# Patient Record
Sex: FEMALE | NOT HISPANIC OR LATINO | Employment: FULL TIME | URBAN - METROPOLITAN AREA
[De-identification: names, ages, dates, MRNs, and addresses within clinical notes are randomized per-mention and may not be internally consistent; named-entity substitution may affect disease eponyms.]

---

## 2016-11-04 LAB — HIV: NON REACTIVE

## 2018-05-09 ENCOUNTER — OFFICE VISIT (OUTPATIENT)
Dept: FAMILY MEDICINE | Facility: CLINIC | Age: 43
End: 2018-05-09
Payer: COMMERCIAL

## 2018-05-09 VITALS
DIASTOLIC BLOOD PRESSURE: 55 MMHG | TEMPERATURE: 98 F | SYSTOLIC BLOOD PRESSURE: 97 MMHG | WEIGHT: 204 LBS | BODY MASS INDEX: 30.21 KG/M2 | HEIGHT: 69 IN | RESPIRATION RATE: 18 BRPM | HEART RATE: 69 BPM | OXYGEN SATURATION: 98 %

## 2018-05-09 DIAGNOSIS — R01.1 HEART MURMUR PREVIOUSLY UNDIAGNOSED: ICD-10-CM

## 2018-05-09 DIAGNOSIS — Z12.39 SCREENING FOR MALIGNANT NEOPLASM OF BREAST: ICD-10-CM

## 2018-05-09 DIAGNOSIS — E07.9 THYROID DISEASE: Primary | ICD-10-CM

## 2018-05-09 DIAGNOSIS — N35.9 URETHRAL STRICTURE, UNSPECIFIED STRICTURE TYPE: ICD-10-CM

## 2018-05-09 LAB
BILIRUB SERPL-MCNC: ABNORMAL MG/DL
BLOOD URINE, POC: ABNORMAL
COLOR, POC UA: YELLOW
GLUCOSE UR QL STRIP: NEGATIVE
KETONES UR QL STRIP: NEGATIVE
LEUKOCYTE ESTERASE URINE, POC: ABNORMAL
NITRITE, POC UA: NEGATIVE
PH, POC UA: 5
PROTEIN, POC: ABNORMAL
SPECIFIC GRAVITY, POC UA: 1.02
UROBILINOGEN, POC UA: 0.2

## 2018-05-09 PROCEDURE — 81002 URINALYSIS NONAUTO W/O SCOPE: CPT | Mod: S$GLB,,, | Performed by: FAMILY MEDICINE

## 2018-05-09 PROCEDURE — 99213 OFFICE O/P EST LOW 20 MIN: CPT | Mod: 25,S$GLB,, | Performed by: FAMILY MEDICINE

## 2018-05-09 PROCEDURE — 3008F BODY MASS INDEX DOCD: CPT | Mod: CPTII,S$GLB,, | Performed by: FAMILY MEDICINE

## 2018-05-09 RX ORDER — IBUPROFEN 800 MG/1
800 TABLET ORAL 3 TIMES DAILY
COMMUNITY
End: 2020-02-17

## 2018-05-09 RX ORDER — CIPROFLOXACIN 500 MG/1
500 TABLET ORAL EVERY 12 HOURS
Qty: 14 TABLET | Refills: 0 | Status: SHIPPED | OUTPATIENT
Start: 2018-05-09 | End: 2018-05-16

## 2018-05-09 NOTE — PROGRESS NOTES
Subjective:       Patient ID: Tania Smith is a 43 y.o. female.    Chief Complaint: Establish Care and Thyroid Problem    HPI   Ms. Smith presents to [facility]. Has not seen a doctor in 6-12 months. Complains of fatigue and hair loss for several months. Reports a history of abnormal thyroid labs. Also has a history of ureteral stricture - dilation. Complains of dysuria and increased frequency of urination for several days.     Has never had a mammogram; no family history of abnormal mammograms.    Review of Systems   Constitutional: Positive for fatigue. Negative for appetite change, fever and unexpected weight change.   Cardiovascular: Negative for chest pain, palpitations and leg swelling.   Endocrine: Positive for polyuria. Negative for cold intolerance and heat intolerance.        HAIR LOSS   Genitourinary: Positive for dysuria. Negative for flank pain, frequency and hematuria.   Skin: Negative for color change, pallor, rash and wound.       Past Medical History:   Diagnosis Date    Neuromuscular disorder     Thyroid disease      Past Surgical History:   Procedure Laterality Date    CHOLECYSTECTOMY      HYSTERECTOMY  2010    partial - still has ovaries    URETHRA SURGERY  2017     Social History     Social History    Marital status: Single     Spouse name: N/A    Number of children: N/A    Years of education: N/A     Occupational History    Not on file.     Social History Main Topics    Smoking status: Current Every Day Smoker     Packs/day: 1.00    Smokeless tobacco: Never Used    Alcohol use Yes      Comment: socially     Drug use: No    Sexual activity: Yes     Partners: Male     Birth control/ protection: See Surgical Hx     Other Topics Concern    Not on file     Social History Narrative    No narrative on file     Family History   Problem Relation Age of Onset    Hypertension Mother     Diabetes Mother     Hepatitis Mother     Parkinsonism Father     Throat cancer Father      "Parkinsonism Sister     Crohn's disease Sister        Objective:      BP (!) 97/55 (BP Location: Left arm, Patient Position: Sitting)   Pulse 69   Temp 98.3 °F (36.8 °C) (Tympanic)   Resp 18   Ht 5' 9" (1.753 m)   Wt 92.5 kg (204 lb)   LMP  (LMP Unknown)   SpO2 98%   BMI 30.13 kg/m²   Physical Exam   Constitutional: She is oriented to person, place, and time. She appears well-developed and well-nourished. No distress.   Eyes: Conjunctivae and EOM are normal. Pupils are equal, round, and reactive to light. No scleral icterus.   Cardiovascular: Normal rate and regular rhythm.  Exam reveals no friction rub.    Murmur heard.  Pulmonary/Chest: Effort normal and breath sounds normal. No respiratory distress. She has no wheezes.   Neurological: She is alert and oriented to person, place, and time. No cranial nerve deficit.   Skin: Skin is warm and dry. No rash noted. She is not diaphoretic.   Vitals reviewed.      Assessment:       1. Thyroid disease    2. Urethral stricture, unspecified stricture type    3. Screening for malignant neoplasm of breast    4. Heart murmur previously undiagnosed        Plan:       Thyroid disease  -     TSH previously abnormal; due for bloodwork  -     TSH; Future; Expected date: 05/09/2018  -     T4, free; Future; Expected date: 05/09/2018  -     CBC auto differential; Future; Expected date: 05/09/2018  -     Comprehensive metabolic panel; Future; Expected date: 05/09/2018    Urethral stricture, unspecified stricture type  -    +UTI; sending in antibiotic  -     POCT urine dipstick without microscope    Screening for malignant neoplasm of breast  - patient has never had a mammogram  -     Mammo Digital Screening Bilateral With CAD; Future; Expected date: 05/09/2018    Heart murmur previously undiagnosed  -     With associated fatigue; start work-up with echo; see for follow-up/result review in 2-3 weeks  -     Transthoracic echo (TTE) complete (CUPID ONLY-[facility], [facility] [place]); Future            Risks, benefits, and side effects were discussed with the patient. All questions were answered to the fullest satisfaction of the patient, and pt verbalized understanding and agreement to treatment plan. Pt was to call with any new or worsening symptoms, or present to the ER.

## 2018-05-18 ENCOUNTER — HOSPITAL ENCOUNTER (OUTPATIENT)
Dept: RADIOLOGY | Facility: HOSPITAL | Age: 43
Discharge: HOME | End: 2018-05-18
Attending: FAMILY MEDICINE
Payer: COMMERCIAL

## 2018-05-18 ENCOUNTER — HOSPITAL ENCOUNTER (OUTPATIENT)
Dept: CARDIOLOGY | Facility: HOSPITAL | Age: 43
Discharge: HOME | End: 2018-05-18
Attending: FAMILY MEDICINE
Payer: COMMERCIAL

## 2018-05-18 DIAGNOSIS — Z12.39 SCREENING FOR MALIGNANT NEOPLASM OF BREAST: ICD-10-CM

## 2018-05-18 DIAGNOSIS — R01.1 HEART MURMUR PREVIOUSLY UNDIAGNOSED: ICD-10-CM

## 2018-05-18 PROCEDURE — 77067 SCR MAMMO BI INCL CAD: CPT | Mod: 26,,, | Performed by: RADIOLOGY

## 2018-05-18 PROCEDURE — 93306 TTE W/DOPPLER COMPLETE: CPT | Mod: 26,,, | Performed by: FAMILY MEDICINE

## 2018-05-18 PROCEDURE — C8929 TTE W OR WO FOL WCON,DOPPLER: HCPCS

## 2018-05-18 PROCEDURE — 77067 SCR MAMMO BI INCL CAD: CPT | Mod: TC

## 2018-05-22 LAB
AORTIC VALVE CUSP SEPERATION: 2 CM
AV PEAK GRADIENT: 9 MMHG
AV VELOCITY RATIO: 1.07
CV ECHO LV RWT: 0.43 CM
DOP CALC AO PEAK VEL: 1.5 M/S
DOP CALC LVOT PEAK VEL: 1.6 M/S
ECHO EF ESTIMATED: 55 %
ECHO LV POSTERIOR WALL: 0.9 CM (ref 0.6–1.1)
FRACTIONAL SHORTENING: 41 % (ref 28–44)
INTERVENTRICULAR SEPTUM: 1.3 CM (ref 0.6–1.1)
LEFT ATRIUM SIZE: 4.2 CM
LEFT INTERNAL DIMENSION IN SYSTOLE: 3 CM (ref 2.1–4)
LEFT VENTRICULAR INTERNAL DIMENSION IN DIASTOLE: 5.1 CM (ref 3.5–6)
MV STENOSIS PRESSURE HALF TIME: 72 MS
MV VALVE AREA P 1/2 METHOD: 3.06 CM2
TR MAX PG: 30 MMHG

## 2018-06-06 ENCOUNTER — OFFICE VISIT (OUTPATIENT)
Dept: FAMILY MEDICINE | Facility: CLINIC | Age: 43
End: 2018-06-06
Payer: COMMERCIAL

## 2018-06-06 VITALS
HEIGHT: 68 IN | DIASTOLIC BLOOD PRESSURE: 68 MMHG | OXYGEN SATURATION: 99 % | WEIGHT: 200 LBS | HEART RATE: 57 BPM | BODY MASS INDEX: 30.31 KG/M2 | TEMPERATURE: 97 F | SYSTOLIC BLOOD PRESSURE: 134 MMHG | RESPIRATION RATE: 18 BRPM

## 2018-06-06 DIAGNOSIS — E07.9 THYROID DISEASE: Primary | Chronic | ICD-10-CM

## 2018-06-06 DIAGNOSIS — F17.200 TOBACCO DEPENDENCE: ICD-10-CM

## 2018-06-06 DIAGNOSIS — L65.9 HAIR LOSS: ICD-10-CM

## 2018-06-06 PROCEDURE — 3008F BODY MASS INDEX DOCD: CPT | Mod: CPTII,S$GLB,, | Performed by: FAMILY MEDICINE

## 2018-06-06 PROCEDURE — 99213 OFFICE O/P EST LOW 20 MIN: CPT | Mod: S$GLB,,, | Performed by: FAMILY MEDICINE

## 2018-06-06 RX ORDER — BUPROPION HYDROCHLORIDE 150 MG/1
150 TABLET ORAL DAILY
Qty: 90 TABLET | Refills: 3 | Status: SHIPPED | OUTPATIENT
Start: 2018-06-06 | End: 2020-02-03 | Stop reason: SDUPTHER

## 2018-06-06 NOTE — LETTER
June 6, 2018      [facility] - [facility] Medicine  [address] [place] [address] [place] [address]  Phone: [phone]  Fax: [phone]       Patient: Tania Smith   YOB: 1975  Date of Visit: 06/06/2018    To Whom It May Concern:    Norberto Smith  was at [facility] on 06/06/2018. She may return to work on 6/6/2018 with no restrictions. If you have any questions or concerns, or if I can be of further assistance, please do not hesitate to contact me.    Sincerely,    Tiffany Atwood MA

## 2018-06-06 NOTE — PROGRESS NOTES
Subjective:       Patient ID: Tania Smith is a 43 y.o. female.    Chief Complaint: Follow-up (lab results)    HPI   Ms. Smith presents to f/u with labs.  Complains of fatigue and hair loss for several months. Reports a history of abnormal thyroid labs.  Has a history of eczema of the scalp.  Has tried several shampoos with no improvement.  Has seen dermatology in the past and was given a steroid liquid to put on her scalp.  She says it didn't help.      Hospital Outpatient Visit on 05/18/2018   Component Date Value Ref Range Status    AORTIC VALVE CUSP SEPERATION 05/18/2018 2  cm Final    LVIDD 05/18/2018 5.1  3.5 - 6.0 cm Final    IVS 05/18/2018 1.3  0.6 - 1.1 cm Final    PW 05/18/2018 0.9  0.6 - 1.1 cm Final    LVIDS 05/18/2018 3.0  2.1 - 4.0 cm Final    FS 05/18/2018 41  28 - 44 % Final    LA size 05/18/2018 4.2  cm Final    Left Ventricle Relative Wall Thick* 05/18/2018 0.43  cm Final    AV Velocity Ratio 05/18/2018 1.07   Final    MV valve area p 1/2 method 05/18/2018 3.06  cm2 Final    LVOT peak stacie 05/18/2018 1.6  m/s Final    Ao peak stacie 05/18/2018 1.5  m/s Final    AV peak gradient 05/18/2018 9  mmHg Final    MV stenosis pressure 1/2 time 05/18/2018 72  ms Final    Triscuspid Valve Regurgitation Pea* 05/18/2018 30.0  mmHg Final    Echo EF Estimated 05/18/2018 55  % Final   Lab Visit on 05/18/2018   Component Date Value Ref Range Status    TSH 05/18/2018 3.303  0.340 - 5.600 uIU/mL Final    Free T4 05/18/2018 0.74  0.71 - 1.51 ng/dL Final    WBC 05/18/2018 6.93  3.90 - 12.70 K/uL Final    RBC 05/18/2018 4.17  4.00 - 5.40 M/uL Final    Hemoglobin 05/18/2018 13.0  12.0 - 16.0 g/dL Final    Hematocrit 05/18/2018 38.3  37.0 - 48.5 % Final    MCV 05/18/2018 92  82 - 98 fL Final    MCH 05/18/2018 31.2* 27.0 - 31.0 pg Final    MCHC 05/18/2018 33.9  32.0 - 36.0 g/dL Final    RDW 05/18/2018 12.3  11.5 - 14.5 % Final    Platelets 05/18/2018 247  150 - 350 K/uL Final    MPV 05/18/2018 10.0   9.2 - 12.9 fL Final    Immature Granulocytes 05/18/2018 0.3  0.0 - 0.5 % Final    Gran # (ANC) 05/18/2018 3.8  1.8 - 7.7 K/uL Final    Immature Grans (Abs) 05/18/2018 0.02  0.00 - 0.04 K/uL Final    Comment: Mild elevation in immature granulocytes is non specific and   can be seen in a variety of conditions including stress response,   acute inflammation, trauma and pregnancy. Correlation with other   laboratory and clinical findings is essential.      Lymph # 05/18/2018 2.3  1.0 - 4.8 K/uL Final    Mono # 05/18/2018 0.5  0.3 - 1.0 K/uL Final    Eos # 05/18/2018 0.2  0.0 - 0.5 K/uL Final    Baso # 05/18/2018 0.04  0.00 - 0.20 K/uL Final    nRBC 05/18/2018 0  0 /100 WBC Final    Gran% 05/18/2018 54.6  38.0 - 73.0 % Final    Lymph% 05/18/2018 33.8  18.0 - 48.0 % Final    Mono% 05/18/2018 7.8  4.0 - 15.0 % Final    Eosinophil% 05/18/2018 2.9  0.0 - 8.0 % Final    Basophil% 05/18/2018 0.6  0.0 - 1.9 % Final    Differential Method 05/18/2018 Automated   Final    Sodium 05/18/2018 138  136 - 145 mmol/L Final    Potassium 05/18/2018 4.0  3.5 - 5.1 mmol/L Final    Chloride 05/18/2018 107  95 - 110 mmol/L Final    CO2 05/18/2018 26  23 - 29 mmol/L Final    Glucose 05/18/2018 104  70 - 110 mg/dL Final    BUN, Bld 05/18/2018 16  6 - 20 mg/dL Final    Creatinine 05/18/2018 0.8  0.5 - 1.4 mg/dL Final    Calcium 05/18/2018 8.6* 8.7 - 10.5 mg/dL Final    Total Protein 05/18/2018 6.7  6.0 - 8.4 g/dL Final    Albumin 05/18/2018 4.2  3.5 - 5.2 g/dL Final    Total Bilirubin 05/18/2018 0.3  0.1 - 1.0 mg/dL Final    Comment: For infants and newborns, interpretation of results should be based  on gestational age, weight and in agreement with clinical  observations.  Premature Infant recommended reference ranges:  Up to 24 hours.............<8.0 mg/dL  Up to 48 hours............<12.0 mg/dL  3-5 days..................<15.0 mg/dL  6-29 days.................<15.0 mg/dL      Alkaline Phosphatase 05/18/2018 38* 55 -  135 U/L Final    AST 05/18/2018 17  10 - 40 U/L Final    ALT 05/18/2018 13  10 - 44 U/L Final    Anion Gap 05/18/2018 5* 8 - 16 mmol/L Final    eGFR if African American 05/18/2018 >60.0  >60 mL/min/1.73 m^2 Final    eGFR if non African American 05/18/2018 >60.0  >60 mL/min/1.73 m^2 Final    Comment: Calculation used to obtain the estimated glomerular filtration  rate (eGFR) is the CKD-EPI equation.      Office Visit on 05/09/2018   Component Date Value Ref Range Status    Color, UA 05/09/2018 yellow   Final    Spec Grav UA 05/09/2018 1.025   Final    pH, UA 05/09/2018 5.0   Final    WBC, UA 05/09/2018 small   Final    Nitrite, UA 05/09/2018 negative   Final    Protein 05/09/2018 trace   Final    Glucose, UA 05/09/2018 negative   Final    Ketones, UA 05/09/2018 negative   Final    Urobilinogen, UA 05/09/2018 0.2   Final    Bilirubin 05/09/2018 small   Final    Blood, UA 05/09/2018 large   Final             Review of Systems   Constitutional: Positive for fatigue. Negative for appetite change, fever and unexpected weight change.   Cardiovascular: Negative for chest pain, palpitations and leg swelling.   Endocrine: Negative for cold intolerance, heat intolerance and polyuria.        HAIR LOSS   Genitourinary: Negative for dysuria, flank pain, frequency and hematuria.   Skin: Negative for color change, pallor, rash and wound.       Past Medical History:   Diagnosis Date    Neuromuscular disorder     Thyroid disease      Past Surgical History:   Procedure Laterality Date    CHOLECYSTECTOMY      HYSTERECTOMY  2010    partial - still has ovaries    URETHRA SURGERY  2017     Social History     Social History    Marital status: Single     Spouse name: N/A    Number of children: N/A    Years of education: N/A     Occupational History    Not on file.     Social History Main Topics    Smoking status: Current Every Day Smoker     Packs/day: 1.00    Smokeless tobacco: Never Used    Alcohol use Yes       "Comment: socially     Drug use: No    Sexual activity: Yes     Partners: Male     Birth control/ protection: See Surgical Hx     Other Topics Concern    Not on file     Social History Narrative    No narrative on file     Family History   Problem Relation Age of Onset    Hypertension Mother     Diabetes Mother     Hepatitis Mother     Parkinsonism Father     Throat cancer Father     Parkinsonism Sister     Crohn's disease Sister     Breast cancer Neg Hx        Objective:      /68   Pulse (!) 57   Temp 97.3 °F (36.3 °C) (Tympanic)   Resp 18   Ht 5' 8" (1.727 m)   Wt 90.7 kg (200 lb)   LMP  (LMP Unknown)   SpO2 99%   BMI 30.41 kg/m²   Physical Exam   Constitutional: She is oriented to person, place, and time. She appears well-developed and well-nourished. No distress.   Eyes: Conjunctivae and EOM are normal. Pupils are equal, round, and reactive to light. No scleral icterus.   Cardiovascular: Normal rate and regular rhythm.  Exam reveals no friction rub.    Murmur heard.  Pulmonary/Chest: Effort normal and breath sounds normal. No respiratory distress. She has no wheezes.   Neurological: She is alert and oriented to person, place, and time. No cranial nerve deficit.   Skin: Skin is warm and dry. Rash noted. She is not diaphoretic.   Multiple eczematous areas on hands, arms and scalp.    Vitals reviewed.      Assessment:       1. Thyroid disease    2. Tobacco dependence    3. Hair loss        Plan:       Thyroid disease   -  stable.  Continue current medication  Tobacco dependence  -     buPROPion (WELLBUTRIN XL) 150 MG TB24 tablet; Take 1 tablet (150 mg total) by mouth once daily.  Dispense: 90 tablet; Refill: 3    Hair loss   - Scalp eczema is most likely cause.  She can try sedrick tree oil on her scalp.  If this doesn't help she can go back to dermatology.                 Risks, benefits, and side effects were discussed with the patient. All questions were answered to the fullest satisfaction " of the patient, and pt verbalized understanding and agreement to treatment plan. Pt was to call with any new or worsening symptoms, or present to the ER.

## 2020-01-20 ENCOUNTER — OFFICE VISIT (OUTPATIENT)
Dept: FAMILY MEDICINE | Facility: CLINIC | Age: 45
End: 2020-01-20
Payer: COMMERCIAL

## 2020-01-20 VITALS
RESPIRATION RATE: 16 BRPM | TEMPERATURE: 98 F | SYSTOLIC BLOOD PRESSURE: 118 MMHG | OXYGEN SATURATION: 98 % | WEIGHT: 221.81 LBS | HEIGHT: 68 IN | DIASTOLIC BLOOD PRESSURE: 78 MMHG | HEART RATE: 75 BPM | BODY MASS INDEX: 33.62 KG/M2

## 2020-01-20 DIAGNOSIS — R39.9 LOWER URINARY TRACT SYMPTOMS: ICD-10-CM

## 2020-01-20 DIAGNOSIS — R09.81 NASAL CONGESTION: ICD-10-CM

## 2020-01-20 DIAGNOSIS — L40.9 PSORIASIS: Primary | ICD-10-CM

## 2020-01-20 DIAGNOSIS — N32.0: ICD-10-CM

## 2020-01-20 PROCEDURE — 99214 PR OFFICE/OUTPT VISIT, EST, LEVL IV, 30-39 MIN: ICD-10-PCS | Mod: S$GLB,,, | Performed by: FAMILY MEDICINE

## 2020-01-20 PROCEDURE — 99214 OFFICE O/P EST MOD 30 MIN: CPT | Mod: S$GLB,,, | Performed by: FAMILY MEDICINE

## 2020-01-20 RX ORDER — CLOBETASOL PROPIONATE 0.46 MG/ML
SOLUTION TOPICAL 2 TIMES DAILY
Qty: 50 ML | Refills: 1 | Status: SHIPPED | OUTPATIENT
Start: 2020-01-20

## 2020-01-20 RX ORDER — NITROFURANTOIN 25; 75 MG/1; MG/1
100 CAPSULE ORAL 2 TIMES DAILY
Qty: 14 CAPSULE | Refills: 0 | Status: SHIPPED | OUTPATIENT
Start: 2020-01-20 | End: 2020-02-03 | Stop reason: ALTCHOICE

## 2020-01-20 RX ORDER — CLOBETASOL PROPIONATE 0.5 MG/G
CREAM TOPICAL 2 TIMES DAILY
Qty: 60 G | Refills: 11 | Status: SHIPPED | OUTPATIENT
Start: 2020-01-20

## 2020-01-20 RX ORDER — GUAIFENESIN, PSEUDOEPHEDRINE HYDROCHLORIDE 600; 60 MG/1; MG/1
2 TABLET, EXTENDED RELEASE ORAL 2 TIMES DAILY
Qty: 60 TABLET | Refills: 0 | Status: SHIPPED | OUTPATIENT
Start: 2020-01-20 | End: 2020-01-30

## 2020-01-20 NOTE — PROGRESS NOTES
"  [facility] - Clinic Note    Subjective      Ms. Smith is a 44 y.o. female who presents to clinic for follow up.     Psoriasis  Has seen a Dermatologist. Has used creams before that work but this is worse than normal.    UTI symptoms. Slow urination. Has a history of a stricture. Now just with slow flow.   Has a bladder stricture. Has had a dilator.     Viral URI   Sinus congestion, rhinorrhea, coughing. No fevers. Chest congestion.         PMH Tania has a past medical history of Neuromuscular disorder and Thyroid disease.   PSXH Tania has a past surgical history that includes Cholecystectomy; Urethra surgery (2017); and Hysterectomy (2010).   FH Tania's family history includes Crohn's disease in her sister; Diabetes in her mother; Hepatitis in her mother; Hypertension in her mother; Parkinsonism in her father and sister; Throat cancer in her father.   YAZMIN Marin reports that she has been smoking. She has been smoking about 1.00 pack per day. She has never used smokeless tobacco. She reports that she drinks alcohol. She reports that she does not use drugs.   MIKE Marin is allergic to codeine.   RAJANI Marin has a current medication list which includes the following prescription(s): bupropion, clobetasol, clobetasol, ibuprofen, nitrofurantoin (macrocrystal-monohydrate), and pseudoephedrine-guaifenesin  mg.     Review of Systems   Constitutional: Negative for fever.   HENT: Positive for congestion and rhinorrhea. Negative for sinus pressure.    Respiratory: Positive for cough. Negative for shortness of breath.    Skin: Positive for rash.     ROS otherwise negative  Objective     /78 (BP Location: Right arm, Patient Position: Sitting, BP Method: Medium (Automatic))   Pulse 75   Temp 98.2 °F (36.8 °C) (Oral)   Resp 16   Ht 5' 8" (1.727 m)   Wt 100.6 kg (221 lb 12.8 oz)   LMP  (LMP Unknown)   SpO2 98%   BMI 33.72 kg/m²     Physical Exam   Constitutional: She appears well-developed and " well-nourished. No distress.   HENT:   Head: Normocephalic and atraumatic.   Right Ear: External ear normal.   Left Ear: External ear normal.   Mouth/Throat: Mucous membranes are normal. Posterior oropharyngeal erythema present. No oropharyngeal exudate or tonsillar abscesses.   Eyes: Conjunctivae are normal.   Neck: No thyromegaly present.   Cardiovascular: Normal rate, regular rhythm, normal heart sounds and intact distal pulses.   No murmur heard.  Pulmonary/Chest: Effort normal and breath sounds normal. She has no wheezes. She has no rales.   Abdominal: She exhibits no distension.   Musculoskeletal: She exhibits no deformity.   Neurological: She is alert.   Skin: Skin is warm.   Psychiatric: She has a normal mood and affect.   Vitals reviewed.             Assessment/Plan     1. Psoriasis  clobetasol (TEMOVATE) 0.05 % external solution    clobetasol (TEMOVATE) 0.05 % cream   2. Nasal congestion  pseudoephedrine-guaifenesin  mg (MUCINEX D)  mg per tablet   3. Stricture of bladder neck  Ambulatory referral to Urology   4. Lower urinary tract symptoms  Urine culture    nitrofurantoin, macrocrystal-monohydrate, (MACROBID) 100 MG capsule       Advised that mucinex d could worsen urinary symptoms. Strict precautions given.   Recommend moisturizers including Vaseline on areas of dry, reddened skin.   Clobetasol for scalp and legs.    Follow up in about 1 month (around 2/20/2020).        Nica Ortega MD  Family Medicine  [facility]  [phone]

## 2020-01-21 ENCOUNTER — TELEPHONE (OUTPATIENT)
Dept: FAMILY MEDICINE | Facility: CLINIC | Age: 45
End: 2020-01-21

## 2020-01-21 NOTE — TELEPHONE ENCOUNTER
All script call in.        Attempt to call pt, no answer, left message to return the call.          ----- Message from Candice Mills sent at 1/21/2020 12:17 PM CST -----  Contact: patient  Type: Needs Medical Advice    Who Called:  Patient  Symptoms (please be specific):    How long has patient had these symptoms:    Pharmacy name and phone #:    Best Call Back Number: [phone]  Additional Information: requesting a call back regarding medications,didn't know the name of them

## 2020-01-24 DIAGNOSIS — L40.9 PSORIASIS: ICD-10-CM

## 2020-01-24 RX ORDER — CLOBETASOL PROPIONATE 0.5 MG/G
CREAM TOPICAL 2 TIMES DAILY
Qty: 60 G | Refills: 11 | Status: CANCELLED | OUTPATIENT
Start: 2020-01-24

## 2020-01-24 RX ORDER — CLOBETASOL PROPIONATE 0.46 MG/ML
SOLUTION TOPICAL 2 TIMES DAILY
Qty: 50 ML | Refills: 1 | Status: CANCELLED | OUTPATIENT
Start: 2020-01-24

## 2020-02-03 ENCOUNTER — PATIENT OUTREACH (OUTPATIENT)
Dept: ADMINISTRATIVE | Facility: OTHER | Age: 45
End: 2020-02-03

## 2020-02-03 ENCOUNTER — OFFICE VISIT (OUTPATIENT)
Dept: UROLOGY | Facility: CLINIC | Age: 45
End: 2020-02-03
Payer: COMMERCIAL

## 2020-02-03 ENCOUNTER — LAB VISIT (OUTPATIENT)
Dept: LAB | Facility: HOSPITAL | Age: 45
End: 2020-02-03
Attending: FAMILY MEDICINE
Payer: COMMERCIAL

## 2020-02-03 ENCOUNTER — OFFICE VISIT (OUTPATIENT)
Dept: FAMILY MEDICINE | Facility: CLINIC | Age: 45
End: 2020-02-03
Payer: COMMERCIAL

## 2020-02-03 VITALS
HEART RATE: 67 BPM | HEIGHT: 69 IN | OXYGEN SATURATION: 97 % | RESPIRATION RATE: 15 BRPM | SYSTOLIC BLOOD PRESSURE: 112 MMHG | BODY MASS INDEX: 32.83 KG/M2 | DIASTOLIC BLOOD PRESSURE: 77 MMHG | WEIGHT: 221.63 LBS | TEMPERATURE: 98 F

## 2020-02-03 VITALS
HEIGHT: 69 IN | TEMPERATURE: 98 F | DIASTOLIC BLOOD PRESSURE: 64 MMHG | HEART RATE: 69 BPM | SYSTOLIC BLOOD PRESSURE: 110 MMHG | BODY MASS INDEX: 33.03 KG/M2 | WEIGHT: 223 LBS

## 2020-02-03 DIAGNOSIS — Z01.818 PREOPERATIVE CLEARANCE: ICD-10-CM

## 2020-02-03 DIAGNOSIS — N35.92 STRICTURE OF FEMALE URETHRA, UNSPECIFIED STRICTURE TYPE: Primary | ICD-10-CM

## 2020-02-03 DIAGNOSIS — E07.9 THYROID DISORDER: ICD-10-CM

## 2020-02-03 DIAGNOSIS — Z13.220 SCREENING FOR HYPERLIPIDEMIA: ICD-10-CM

## 2020-02-03 DIAGNOSIS — Z12.31 ENCOUNTER FOR SCREENING MAMMOGRAM FOR BREAST CANCER: Primary | ICD-10-CM

## 2020-02-03 DIAGNOSIS — F17.200 TOBACCO DEPENDENCE: ICD-10-CM

## 2020-02-03 LAB
ANION GAP SERPL CALC-SCNC: 10 MMOL/L (ref 8–16)
BASOPHILS # BLD AUTO: 0.06 K/UL (ref 0–0.2)
BASOPHILS NFR BLD: 0.8 % (ref 0–1.9)
BILIRUB SERPL-MCNC: NEGATIVE MG/DL
BLOOD URINE, POC: NEGATIVE
BUN SERPL-MCNC: 13 MG/DL (ref 6–20)
CALCIUM SERPL-MCNC: 9.1 MG/DL (ref 8.7–10.5)
CHLORIDE SERPL-SCNC: 107 MMOL/L (ref 95–110)
CHOLEST SERPL-MCNC: 210 MG/DL (ref 120–199)
CHOLEST/HDLC SERPL: 6.2 {RATIO} (ref 2–5)
CO2 SERPL-SCNC: 23 MMOL/L (ref 23–29)
COLOR, POC UA: NORMAL
CREAT SERPL-MCNC: 0.8 MG/DL (ref 0.5–1.4)
DIFFERENTIAL METHOD: NORMAL
EOSINOPHIL # BLD AUTO: 0.2 K/UL (ref 0–0.5)
EOSINOPHIL NFR BLD: 2.8 % (ref 0–8)
ERYTHROCYTE [DISTWIDTH] IN BLOOD BY AUTOMATED COUNT: 12.9 % (ref 11.5–14.5)
EST. GFR  (AFRICAN AMERICAN): >60 ML/MIN/1.73 M^2
EST. GFR  (NON AFRICAN AMERICAN): >60 ML/MIN/1.73 M^2
GLUCOSE SERPL-MCNC: 99 MG/DL (ref 70–110)
GLUCOSE UR QL STRIP: NEGATIVE
HCT VFR BLD AUTO: 41.9 % (ref 37–48.5)
HDLC SERPL-MCNC: 34 MG/DL (ref 40–75)
HDLC SERPL: 16.2 % (ref 20–50)
HGB BLD-MCNC: 13.8 G/DL (ref 12–16)
IMM GRANULOCYTES # BLD AUTO: 0.02 K/UL (ref 0–0.04)
IMM GRANULOCYTES NFR BLD AUTO: 0.3 % (ref 0–0.5)
KETONES UR QL STRIP: NEGATIVE
LDLC SERPL CALC-MCNC: 145 MG/DL (ref 63–159)
LEUKOCYTE ESTERASE URINE, POC: NEGATIVE
LYMPHOCYTES # BLD AUTO: 2.8 K/UL (ref 1–4.8)
LYMPHOCYTES NFR BLD: 35.4 % (ref 18–48)
MCH RBC QN AUTO: 30.7 PG (ref 27–31)
MCHC RBC AUTO-ENTMCNC: 32.9 G/DL (ref 32–36)
MCV RBC AUTO: 93 FL (ref 82–98)
MONOCYTES # BLD AUTO: 0.6 K/UL (ref 0.3–1)
MONOCYTES NFR BLD: 7.3 % (ref 4–15)
NEUTROPHILS # BLD AUTO: 4.2 K/UL (ref 1.8–7.7)
NEUTROPHILS NFR BLD: 53.4 % (ref 38–73)
NITRITE, POC UA: NEGATIVE
NONHDLC SERPL-MCNC: 176 MG/DL
NRBC BLD-RTO: 0 /100 WBC
PH, POC UA: 7
PLATELET # BLD AUTO: 314 K/UL (ref 150–350)
PMV BLD AUTO: 10.1 FL (ref 9.2–12.9)
POTASSIUM SERPL-SCNC: 4.3 MMOL/L (ref 3.5–5.1)
PROTEIN, POC: NEGATIVE
RBC # BLD AUTO: 4.49 M/UL (ref 4–5.4)
SODIUM SERPL-SCNC: 140 MMOL/L (ref 136–145)
SPECIFIC GRAVITY, POC UA: 1.02
T4 FREE SERPL-MCNC: 0.83 NG/DL (ref 0.71–1.51)
TRIGL SERPL-MCNC: 155 MG/DL (ref 30–150)
TSH SERPL DL<=0.005 MIU/L-ACNC: 2.88 UIU/ML (ref 0.34–5.6)
UROBILINOGEN, POC UA: NEGATIVE
WBC # BLD AUTO: 7.91 K/UL (ref 3.9–12.7)

## 2020-02-03 PROCEDURE — 80061 LIPID PANEL: CPT

## 2020-02-03 PROCEDURE — 99214 OFFICE O/P EST MOD 30 MIN: CPT | Mod: S$GLB,,, | Performed by: FAMILY MEDICINE

## 2020-02-03 PROCEDURE — 99999 PR PBB SHADOW E&M-EST. PATIENT-LVL III: CPT | Mod: PBBFAC,,, | Performed by: UROLOGY

## 2020-02-03 PROCEDURE — 99204 PR OFFICE/OUTPT VISIT, NEW, LEVL IV, 45-59 MIN: ICD-10-PCS | Mod: 25,S$GLB,, | Performed by: UROLOGY

## 2020-02-03 PROCEDURE — 99204 OFFICE O/P NEW MOD 45 MIN: CPT | Mod: 25,S$GLB,, | Performed by: UROLOGY

## 2020-02-03 PROCEDURE — 84439 ASSAY OF FREE THYROXINE: CPT

## 2020-02-03 PROCEDURE — 80048 BASIC METABOLIC PNL TOTAL CA: CPT

## 2020-02-03 PROCEDURE — 87086 URINE CULTURE/COLONY COUNT: CPT

## 2020-02-03 PROCEDURE — 99214 PR OFFICE/OUTPT VISIT, EST, LEVL IV, 30-39 MIN: ICD-10-PCS | Mod: S$GLB,,, | Performed by: FAMILY MEDICINE

## 2020-02-03 PROCEDURE — 84443 ASSAY THYROID STIM HORMONE: CPT

## 2020-02-03 PROCEDURE — 85025 COMPLETE CBC W/AUTO DIFF WBC: CPT

## 2020-02-03 PROCEDURE — 99999 PR PBB SHADOW E&M-EST. PATIENT-LVL III: ICD-10-PCS | Mod: PBBFAC,,, | Performed by: UROLOGY

## 2020-02-03 PROCEDURE — 81002 POCT URINE DIPSTICK WITHOUT MICROSCOPE: ICD-10-PCS | Mod: S$GLB,,, | Performed by: UROLOGY

## 2020-02-03 PROCEDURE — 81002 URINALYSIS NONAUTO W/O SCOPE: CPT | Mod: S$GLB,,, | Performed by: UROLOGY

## 2020-02-03 PROCEDURE — 36415 COLL VENOUS BLD VENIPUNCTURE: CPT

## 2020-02-03 RX ORDER — BUPROPION HYDROCHLORIDE 150 MG/1
150 TABLET ORAL DAILY
Qty: 90 TABLET | Refills: 3 | Status: SHIPPED | OUTPATIENT
Start: 2020-02-03 | End: 2021-02-02

## 2020-02-03 NOTE — H&P (VIEW-ONLY)
[facility] New Patient/H&P:    Tania Smith is a 44 y.o. female who presents for urethral stricture.     Patient with a several year history of urethral stricture s/p urethral dilation most recently in 2015. She states that she had been self-dilating at home, but her dilator was thrown away inadvertently.     She now complains of symptoms of stricture recurrence. Patient reports weak stream, incomplete emptying and recurrent urinary tract infections.      Patient does not know what caused her stricture, but states it began after the complicated birth of her son.     She denies any fever, chills, flank pain, dysuria, gross hematuria, bone pain, weight loss, recent  trauma or history of  malignancy.     PVR  80 mL   2/3/20    UA dipstick  Negative   2/3/20    Past Medical History:   Diagnosis Date    Neuromuscular disorder     Thyroid disease        Past Surgical History:   Procedure Laterality Date    CHOLECYSTECTOMY      HYSTERECTOMY  2010    partial - still has ovaries    URETHRA SURGERY  2017       Family History   Problem Relation Age of Onset    Hypertension Mother     Diabetes Mother     Hepatitis Mother     Parkinsonism Father     Throat cancer Father     Parkinsonism Sister     Crohn's disease Sister     Breast cancer Neg Hx        Social History     Socioeconomic History    Marital status: Single     Spouse name: Not on file    Number of children: Not on file    Years of education: Not on file    Highest education level: Not on file   Occupational History    Not on file   Social Needs    Financial resource strain: Not on file    Food insecurity:     Worry: Not on file     Inability: Not on file    Transportation needs:     Medical: Not on file     Non-medical: Not on file   Tobacco Use    Smoking status: Current Every Day Smoker     Packs/day: 1.00    Smokeless tobacco: Never Used   Substance and Sexual Activity    Alcohol use: Yes     Comment: socially      "Drug use: No    Sexual activity: Yes     Partners: Male     Birth control/protection: See Surgical Hx   Lifestyle    Physical activity:     Days per week: Not on file     Minutes per session: Not on file    Stress: Not on file   Relationships    Social connections:     Talks on phone: Not on file     Gets together: Not on file     Attends [employer] service: Not on file     Active member of club or organization: Not on file     Attends meetings of clubs or organizations: Not on file     Relationship status: Not on file   Other Topics Concern    Not on file   Social History Narrative    Not on file       Review of patient's allergies indicates:   Allergen Reactions    Codeine Rash       Medications Reviewed: see MAR    ROS:    Constitutional: denies fevers, chills, night sweats, fatigue, malaise  Respiratory: negative for cough, shortness of breath, wheezing, dyspnea.  Cardiovascular: - for high blood pressure, negative for chest pain, varicose veins, ankle swelling, palpitations, syncope.  GI: negative for abdominal pain, heartburn, indigestion, nausea, vomiting, constipation, diarrhea, blood in stool.   Urology: as noted above in HPI  Endocrinology: negative for cold intolerance, excessive thirst, not feeling tired/sluggish, no heat intolerance.   Hematology/Lymph: negative for easy bleeding, easy bruising, swollen glands.  Musculoskeletal: negative for back pain, joint pain, joint swelling, neck pain.  Allergy-Immunology: negative for seasonal allergies, negative for unusual infections.   Skin: negative for boils, breast lumps, hives, itching, rash.   Neurology: negative for, dizziness, headache, tingling/numbness, tremors.   Psych: satisfied with life; negative for, anxiety, depression, suicidal thoughts.     PHYSICAL EXAM:    Vitals:    02/03/20 1105   BP: 110/64   Pulse: 69   Temp: 97.9 °F (36.6 °C)     Body mass index is 32.93 kg/m². Weight: 101.2 kg (223 lb) Height: 5' 9" (175.3 cm)       General: " Alert, cooperative, no distress, appears stated age  Head: Normocephalic, without obvious abnormality, atraumatic  Neck: no masses, no thyromegaly, no lymphadenopathy  Eyes: PERRL, conjunctiva/corneas clear  Lungs: Respirations unlabored, normal effort, no accessory muscle use  CV: Warm and well perfused extremities  Abdomen: Soft, non-tender, no CVA tenderness, no hepatosplenomegaly, no hernia  Extremities: Extremities normal, atraumatic, no cyanosis or edema  Skin: Normal color, texture, and turgor, no rashes or lesions  Psych: Appropriate, well oriented, normal affect, normal mood  Neuro: Non-focal      LABS:    Lab Results   Component Value Date    WBC 6.93 05/18/2018    HGB 13.0 05/18/2018    HCT 38.3 05/18/2018    MCV 92 05/18/2018     05/18/2018       BMP  Lab Results   Component Value Date     05/18/2018    K 4.0 05/18/2018     05/18/2018    CO2 26 05/18/2018    BUN 16 05/18/2018    CREATININE 0.8 05/18/2018    CALCIUM 8.6 (L) 05/18/2018    ANIONGAP 5 (L) 05/18/2018    ESTGFRAFRICA >60.0 05/18/2018    EGFRNONAA >60.0 05/18/2018       IMAGING:    No urologic imaging      Assessment/Diagnosis:    1. Stricture of female urethra, unspecified stricture type  POCT URINE DIPSTICK WITHOUT MICROSCOPE    Urine culture    Case Request Operating Room: DILATION, URETHRA    Place in Outpatient    Vital Signs     Diet NPO    Place sequential compression device    EKG 12-lead    Diet NPO       Plans:    - I spent 45 minutes with the patient; more than 50% was in counseling about the disease process and methods of treatment. Extensive discussion with patient regarding the etiology and management of her recurrent urethral stricture disease. Explained that it is unclear what led to her stricture, but based on her symptoms she would benefit from cystoscopy, EUA and possible urethral dilation. Patient desires definitive management of her stricture, but I explained that she should undergo further evaluation  prior to referral for possible urethroplasty.   - Scheduled for cystourethroscopy, exam under anesthesia and possible urethral dilation on 2/10/20 at [facility]. All risks, benefits and alternatives discussed at length. Patient verbalizes understanding and wishes to proceed.   - Urine culture today.

## 2020-02-03 NOTE — LETTER
February 3, 2020      Nica Ortega MD  [address] [facility] [place] [address]           [facility]  [address] [place] [address]  Phone: [phone]  Fax: [phone]          Patient: Tania Smith   MR Number: [id number]   YOB: 1975   Date of Visit: 2/3/2020       Dear Dr. Nica Ortega:    Thank you for referring Tania Smith to me for evaluation. Attached you will find relevant portions of my assessment and plan of care.    If you have questions, please do not hesitate to call me. I look forward to following Tania Smith along with you.    Sincerely,    Cristiano Garcia Jr., MD    Enclosure  CC:  No Recipients    If you would like to receive this communication electronically, please contact [email or web address] or [phone] to request more information on [employer] access.    For providers and/or their staff who would like to refer a patient to [facility], please contact us through our one-stop-shop provider referral line, [facility], at [phone].    If you feel you have received this communication in error or would no longer like to receive these types of communications, please e-mail [email or web address]

## 2020-02-03 NOTE — PROGRESS NOTES
"  [facility] - Clinic Note    Subjective      Ms. Smith is a 44 y.o. female who presents to clinic for follow up.    Bladder stricture is getting treated. Has plans for surgery next week.   URI symptoms - was not able to get medicine but did get Mucinex  Is smoking cigarettes. Was taking Wellbutrin at one point which helped. Would like to help.   Blood pressure is well controlled.   Has had a h/o abnormal pap with cancer cells, had a hysterectomy. Was cervical cancer. Left tubes and ovaries which had no problems. Has not been to see a gynecologist. Rapid progressing. Did not have to have any treatments.   Had a "heart test" at some point but everything was fine.   Is able to walk up two flight stairs.     PMH Tania has a past medical history of Neuromuscular disorder and Thyroid disease.   PSXH Tania has a past surgical history that includes Cholecystectomy; Urethra surgery (2017); and Hysterectomy (2010).   ULICES Marin's family history includes Crohn's disease in her sister; Diabetes in her mother; Hepatitis in her mother; Hypertension in her mother; Parkinsonism in her father and sister; Throat cancer in her father.   YAZMIN Marin reports that she has been smoking. She has been smoking about 1.00 pack per day. She has never used smokeless tobacco. She reports that she drinks alcohol. She reports that she does not use drugs.   MIKE Marin is allergic to codeine.   RAJANI Marin has a current medication list which includes the following prescription(s): clobetasol, clobetasol, ibuprofen, and bupropion.     Review of Systems   Constitutional: Negative.    Respiratory: Negative.  Negative for shortness of breath.    Cardiovascular: Negative.  Negative for chest pain.   Endocrine: Negative for cold intolerance and heat intolerance.   Skin: Positive for rash (improved).     ROS otherwise negative  Objective     /77 (BP Location: Right arm, Patient Position: Sitting, BP Method: Medium (Automatic))   Pulse 67   " "Temp 98.2 °F (36.8 °C) (Oral)   Resp 15   Ht 5' 9" (1.753 m)   Wt 100.5 kg (221 lb 9.6 oz)   LMP  (LMP Unknown)   SpO2 97%   BMI 32.72 kg/m²     Physical Exam   Constitutional: She appears well-developed and well-nourished. No distress.   HENT:   Head: Normocephalic and atraumatic.   Eyes: Conjunctivae are normal.   Neck: No thyromegaly present.   Cardiovascular: Normal rate, regular rhythm, normal heart sounds and intact distal pulses.   No murmur heard.  Pulmonary/Chest: Effort normal and breath sounds normal. She has no wheezes. She has no rales.   Musculoskeletal: She exhibits no deformity.   Neurological: She is alert.   Skin: Skin is warm and dry.   Improved appearance of dry erythematous skin on the lower extremities   Psychiatric: She has a normal mood and affect.   Vitals reviewed.     Assessment/Plan     1. Encounter for screening mammogram for breast cancer  Mammo Digital Screening Bilat w/ Jayson   2. Tobacco dependence  buPROPion (WELLBUTRIN XL) 150 MG TB24 tablet   3. Thyroid disorder  Basic metabolic panel    CBC auto differential    TSH    T4, free   4. Screening for hyperlipidemia  Lipid panel   5. Preoperative clearance  Basic metabolic panel    CBC auto differential       Continue clobetasol   Start Wellbutrin for weight loss, depression, smoking  Follow up for pap smear    Patient is LOW RISK for surgery based on revised cardiac risk index.         Future Appointments   Date Time Provider Department Center   2/28/2020  1:00 PM [facility] MAMMO1 [facility] MAMMO [facility]   3/4/2020  4:00 PM Nica Ortega MD [facility]       Nica Ortega MD  Family Medicine  [facility]  [phone]    "

## 2020-02-03 NOTE — PROGRESS NOTES
[facility] New Patient/H&P:    Tania Smith is a 44 y.o. female who presents for urethral stricture.     Patient with a several year history of urethral stricture s/p urethral dilation most recently in 2015. She states that she had been self-dilating at home, but her dilator was thrown away inadvertently.     She now complains of symptoms of stricture recurrence. Patient reports weak stream, incomplete emptying and recurrent urinary tract infections.      Patient does not know what caused her stricture, but states it began after the complicated birth of her son.     She denies any fever, chills, flank pain, dysuria, gross hematuria, bone pain, weight loss, recent  trauma or history of  malignancy.     PVR  80 mL   2/3/20    UA dipstick  Negative   2/3/20    Past Medical History:   Diagnosis Date    Neuromuscular disorder     Thyroid disease        Past Surgical History:   Procedure Laterality Date    CHOLECYSTECTOMY      HYSTERECTOMY  2010    partial - still has ovaries    URETHRA SURGERY  2017       Family History   Problem Relation Age of Onset    Hypertension Mother     Diabetes Mother     Hepatitis Mother     Parkinsonism Father     Throat cancer Father     Parkinsonism Sister     Crohn's disease Sister     Breast cancer Neg Hx        Social History     Socioeconomic History    Marital status: Single     Spouse name: Not on file    Number of children: Not on file    Years of education: Not on file    Highest education level: Not on file   Occupational History    Not on file   Social Needs    Financial resource strain: Not on file    Food insecurity:     Worry: Not on file     Inability: Not on file    Transportation needs:     Medical: Not on file     Non-medical: Not on file   Tobacco Use    Smoking status: Current Every Day Smoker     Packs/day: 1.00    Smokeless tobacco: Never Used   Substance and Sexual Activity    Alcohol use: Yes     Comment: socially      "Drug use: No    Sexual activity: Yes     Partners: Male     Birth control/protection: See Surgical Hx   Lifestyle    Physical activity:     Days per week: Not on file     Minutes per session: Not on file    Stress: Not on file   Relationships    Social connections:     Talks on phone: Not on file     Gets together: Not on file     Attends Nondenominational service: Not on file     Active member of club or organization: Not on file     Attends meetings of clubs or organizations: Not on file     Relationship status: Not on file   Other Topics Concern    Not on file   Social History Narrative    Not on file       Review of patient's allergies indicates:   Allergen Reactions    Codeine Rash       Medications Reviewed: see MAR    ROS:    Constitutional: denies fevers, chills, night sweats, fatigue, malaise  Respiratory: negative for cough, shortness of breath, wheezing, dyspnea.  Cardiovascular: - for high blood pressure, negative for chest pain, varicose veins, ankle swelling, palpitations, syncope.  GI: negative for abdominal pain, heartburn, indigestion, nausea, vomiting, constipation, diarrhea, blood in stool.   Urology: as noted above in HPI  Endocrinology: negative for cold intolerance, excessive thirst, not feeling tired/sluggish, no heat intolerance.   Hematology/Lymph: negative for easy bleeding, easy bruising, swollen glands.  Musculoskeletal: negative for back pain, joint pain, joint swelling, neck pain.  Allergy-Immunology: negative for seasonal allergies, negative for unusual infections.   Skin: negative for boils, breast lumps, hives, itching, rash.   Neurology: negative for, dizziness, headache, tingling/numbness, tremors.   Psych: satisfied with life; negative for, anxiety, depression, suicidal thoughts.     PHYSICAL EXAM:    Vitals:    02/03/20 1105   BP: 110/64   Pulse: 69   Temp: 97.9 °F (36.6 °C)     Body mass index is 32.93 kg/m². Weight: 101.2 kg (223 lb) Height: 5' 9" (175.3 cm)       General: " Alert, cooperative, no distress, appears stated age  Head: Normocephalic, without obvious abnormality, atraumatic  Neck: no masses, no thyromegaly, no lymphadenopathy  Eyes: PERRL, conjunctiva/corneas clear  Lungs: Respirations unlabored, normal effort, no accessory muscle use  CV: Warm and well perfused extremities  Abdomen: Soft, non-tender, no CVA tenderness, no hepatosplenomegaly, no hernia  Extremities: Extremities normal, atraumatic, no cyanosis or edema  Skin: Normal color, texture, and turgor, no rashes or lesions  Psych: Appropriate, well oriented, normal affect, normal mood  Neuro: Non-focal      LABS:    Lab Results   Component Value Date    WBC 6.93 05/18/2018    HGB 13.0 05/18/2018    HCT 38.3 05/18/2018    MCV 92 05/18/2018     05/18/2018       BMP  Lab Results   Component Value Date     05/18/2018    K 4.0 05/18/2018     05/18/2018    CO2 26 05/18/2018    BUN 16 05/18/2018    CREATININE 0.8 05/18/2018    CALCIUM 8.6 (L) 05/18/2018    ANIONGAP 5 (L) 05/18/2018    ESTGFRAFRICA >60.0 05/18/2018    EGFRNONAA >60.0 05/18/2018       IMAGING:    No urologic imaging      Assessment/Diagnosis:    1. Stricture of female urethra, unspecified stricture type  POCT URINE DIPSTICK WITHOUT MICROSCOPE    Urine culture    Case Request Operating Room: DILATION, URETHRA    Place in Outpatient    Vital Signs     Diet NPO    Place sequential compression device    EKG 12-lead    Diet NPO       Plans:    - I spent 45 minutes with the patient; more than 50% was in counseling about the disease process and methods of treatment. Extensive discussion with patient regarding the etiology and management of her recurrent urethral stricture disease. Explained that it is unclear what led to her stricture, but based on her symptoms she would benefit from cystoscopy, EUA and possible urethral dilation. Patient desires definitive management of her stricture, but I explained that she should undergo further evaluation  prior to referral for possible urethroplasty.   - Scheduled for cystourethroscopy, exam under anesthesia and possible urethral dilation on 2/10/20 at [place]. All risks, benefits and alternatives discussed at length. Patient verbalizes understanding and wishes to proceed.   - Urine culture today.

## 2020-02-05 LAB
BACTERIA UR CULT: NORMAL
BACTERIA UR CULT: NORMAL

## 2020-02-10 ENCOUNTER — HOSPITAL ENCOUNTER (OUTPATIENT)
Facility: HOSPITAL | Age: 45
Discharge: HOME | End: 2020-02-10
Attending: UROLOGY | Admitting: UROLOGY
Payer: COMMERCIAL

## 2020-02-10 ENCOUNTER — ANESTHESIA EVENT (OUTPATIENT)
Dept: SURGERY | Facility: HOSPITAL | Age: 45
End: 2020-02-10
Payer: COMMERCIAL

## 2020-02-10 ENCOUNTER — ANESTHESIA (OUTPATIENT)
Dept: SURGERY | Facility: HOSPITAL | Age: 45
End: 2020-02-10
Payer: COMMERCIAL

## 2020-02-10 VITALS
HEART RATE: 73 BPM | OXYGEN SATURATION: 98 % | HEIGHT: 69 IN | WEIGHT: 223 LBS | BODY MASS INDEX: 33.03 KG/M2 | DIASTOLIC BLOOD PRESSURE: 80 MMHG | SYSTOLIC BLOOD PRESSURE: 131 MMHG | RESPIRATION RATE: 12 BRPM | TEMPERATURE: 98 F

## 2020-02-10 DIAGNOSIS — N35.92 STRICTURE OF FEMALE URETHRA, UNSPECIFIED STRICTURE TYPE: Primary | ICD-10-CM

## 2020-02-10 LAB
BACTERIA #/AREA URNS HPF: ABNORMAL /HPF
BILIRUB UR QL STRIP: NEGATIVE
CLARITY UR: CLEAR
COLOR UR: YELLOW
GLUCOSE UR QL STRIP: NEGATIVE
HGB UR QL STRIP: ABNORMAL
KETONES UR QL STRIP: NEGATIVE
LEUKOCYTE ESTERASE UR QL STRIP: ABNORMAL
MICROSCOPIC COMMENT: ABNORMAL
NITRITE UR QL STRIP: NEGATIVE
PH UR STRIP: 5 [PH] (ref 5–8)
PROT UR QL STRIP: NEGATIVE
RBC #/AREA URNS HPF: 40 /HPF (ref 0–4)
SP GR UR STRIP: 1.02 (ref 1–1.03)
SQUAMOUS #/AREA URNS HPF: 4 /HPF
URN SPEC COLLECT METH UR: ABNORMAL
UROBILINOGEN UR STRIP-ACNC: NEGATIVE EU/DL
WBC #/AREA URNS HPF: 5 /HPF (ref 0–5)

## 2020-02-10 PROCEDURE — 37000009 HC ANESTHESIA EA ADD 15 MINS: Performed by: UROLOGY

## 2020-02-10 PROCEDURE — 63600175 PHARM REV CODE 636 W HCPCS: Performed by: UROLOGY

## 2020-02-10 PROCEDURE — 36000707: Performed by: UROLOGY

## 2020-02-10 PROCEDURE — 81000 URINALYSIS NONAUTO W/SCOPE: CPT

## 2020-02-10 PROCEDURE — C1769 GUIDE WIRE: HCPCS | Performed by: UROLOGY

## 2020-02-10 PROCEDURE — 93005 ELECTROCARDIOGRAM TRACING: CPT

## 2020-02-10 PROCEDURE — 93005 ELECTROCARDIOGRAM TRACING: CPT | Mod: 59 | Performed by: INTERNAL MEDICINE

## 2020-02-10 PROCEDURE — 52281 CYSTOSCOPY AND TREATMENT: CPT | Mod: ,,, | Performed by: UROLOGY

## 2020-02-10 PROCEDURE — D9220A PRA ANESTHESIA: ICD-10-PCS | Mod: ,,, | Performed by: ANESTHESIOLOGY

## 2020-02-10 PROCEDURE — 52281 PR CYSTOSCOPY,DIL URETHRAL STRICTURE: ICD-10-PCS | Mod: ,,, | Performed by: UROLOGY

## 2020-02-10 PROCEDURE — 71000039 HC RECOVERY, EACH ADD'L HOUR: Performed by: UROLOGY

## 2020-02-10 PROCEDURE — 36000706: Performed by: UROLOGY

## 2020-02-10 PROCEDURE — 37000008 HC ANESTHESIA 1ST 15 MINUTES: Performed by: UROLOGY

## 2020-02-10 PROCEDURE — 63600175 PHARM REV CODE 636 W HCPCS: Performed by: NURSE ANESTHETIST, CERTIFIED REGISTERED

## 2020-02-10 PROCEDURE — 71000015 HC POSTOP RECOV 1ST HR: Performed by: UROLOGY

## 2020-02-10 PROCEDURE — S0028 INJECTION, FAMOTIDINE, 20 MG: HCPCS

## 2020-02-10 PROCEDURE — D9220A PRA ANESTHESIA: Mod: ,,, | Performed by: ANESTHESIOLOGY

## 2020-02-10 PROCEDURE — 25000003 PHARM REV CODE 250

## 2020-02-10 PROCEDURE — C1726 CATH, BAL DIL, NON-VASCULAR: HCPCS | Performed by: UROLOGY

## 2020-02-10 PROCEDURE — 71000033 HC RECOVERY, INTIAL HOUR: Performed by: UROLOGY

## 2020-02-10 PROCEDURE — 93010 EKG 12-LEAD: ICD-10-PCS | Mod: ,,, | Performed by: INTERNAL MEDICINE

## 2020-02-10 PROCEDURE — 93010 ELECTROCARDIOGRAM REPORT: CPT | Mod: ,,, | Performed by: INTERNAL MEDICINE

## 2020-02-10 PROCEDURE — 63600175 PHARM REV CODE 636 W HCPCS: Performed by: ANESTHESIOLOGY

## 2020-02-10 RX ORDER — SULFAMETHOXAZOLE AND TRIMETHOPRIM 800; 160 MG/1; MG/1
1 TABLET ORAL 2 TIMES DAILY
Qty: 6 TABLET | Refills: 0 | Status: SHIPPED | OUTPATIENT
Start: 2020-02-10 | End: 2020-02-13

## 2020-02-10 RX ORDER — ONDANSETRON 2 MG/ML
4 INJECTION INTRAMUSCULAR; INTRAVENOUS DAILY PRN
Status: DISCONTINUED | OUTPATIENT
Start: 2020-02-10 | End: 2020-02-10 | Stop reason: HOSPADM

## 2020-02-10 RX ORDER — FAMOTIDINE 10 MG/ML
20 INJECTION INTRAVENOUS ONCE
Status: CANCELLED | OUTPATIENT
Start: 2020-02-10 | End: 2020-02-10

## 2020-02-10 RX ORDER — ONDANSETRON 2 MG/ML
INJECTION INTRAMUSCULAR; INTRAVENOUS
Status: DISCONTINUED | OUTPATIENT
Start: 2020-02-10 | End: 2020-02-10

## 2020-02-10 RX ORDER — DIPHENHYDRAMINE HYDROCHLORIDE 50 MG/ML
12.5 INJECTION INTRAMUSCULAR; INTRAVENOUS
Status: DISCONTINUED | OUTPATIENT
Start: 2020-02-10 | End: 2020-02-10 | Stop reason: HOSPADM

## 2020-02-10 RX ORDER — MIDAZOLAM HYDROCHLORIDE 1 MG/ML
INJECTION, SOLUTION INTRAMUSCULAR; INTRAVENOUS
Status: DISCONTINUED | OUTPATIENT
Start: 2020-02-10 | End: 2020-02-10

## 2020-02-10 RX ORDER — PHENAZOPYRIDINE HYDROCHLORIDE 200 MG/1
200 TABLET, FILM COATED ORAL
Qty: 9 TABLET | Refills: 0 | Status: SHIPPED | OUTPATIENT
Start: 2020-02-10 | End: 2020-02-13

## 2020-02-10 RX ORDER — DEXAMETHASONE SODIUM PHOSPHATE 4 MG/ML
INJECTION, SOLUTION INTRA-ARTICULAR; INTRALESIONAL; INTRAMUSCULAR; INTRAVENOUS; SOFT TISSUE
Status: DISCONTINUED | OUTPATIENT
Start: 2020-02-10 | End: 2020-02-10

## 2020-02-10 RX ORDER — MORPHINE SULFATE 4 MG/ML
2 INJECTION, SOLUTION INTRAMUSCULAR; INTRAVENOUS EVERY 5 MIN PRN
Status: DISCONTINUED | OUTPATIENT
Start: 2020-02-10 | End: 2020-02-10 | Stop reason: HOSPADM

## 2020-02-10 RX ORDER — SODIUM CHLORIDE, SODIUM LACTATE, POTASSIUM CHLORIDE, CALCIUM CHLORIDE 600; 310; 30; 20 MG/100ML; MG/100ML; MG/100ML; MG/100ML
INJECTION, SOLUTION INTRAVENOUS CONTINUOUS
Status: CANCELLED | OUTPATIENT
Start: 2020-02-10

## 2020-02-10 RX ORDER — MEPERIDINE HYDROCHLORIDE 50 MG/ML
INJECTION INTRAMUSCULAR; INTRAVENOUS; SUBCUTANEOUS
Status: DISCONTINUED | OUTPATIENT
Start: 2020-02-10 | End: 2020-02-10

## 2020-02-10 RX ORDER — CEFAZOLIN SODIUM 2 G/50ML
2 SOLUTION INTRAVENOUS
Status: COMPLETED | OUTPATIENT
Start: 2020-02-10 | End: 2020-02-10

## 2020-02-10 RX ORDER — SODIUM CHLORIDE, SODIUM LACTATE, POTASSIUM CHLORIDE, CALCIUM CHLORIDE 600; 310; 30; 20 MG/100ML; MG/100ML; MG/100ML; MG/100ML
INJECTION, SOLUTION INTRAVENOUS CONTINUOUS PRN
Status: DISCONTINUED | OUTPATIENT
Start: 2020-02-10 | End: 2020-02-10

## 2020-02-10 RX ORDER — SODIUM CHLORIDE, SODIUM LACTATE, POTASSIUM CHLORIDE, CALCIUM CHLORIDE 600; 310; 30; 20 MG/100ML; MG/100ML; MG/100ML; MG/100ML
125 INJECTION, SOLUTION INTRAVENOUS CONTINUOUS
Status: DISCONTINUED | OUTPATIENT
Start: 2020-02-10 | End: 2020-02-10 | Stop reason: HOSPADM

## 2020-02-10 RX ORDER — PROPOFOL 10 MG/ML
VIAL (ML) INTRAVENOUS
Status: DISCONTINUED | OUTPATIENT
Start: 2020-02-10 | End: 2020-02-10

## 2020-02-10 RX ORDER — LIDOCAINE HYDROCHLORIDE 10 MG/ML
1 INJECTION, SOLUTION EPIDURAL; INFILTRATION; INTRACAUDAL; PERINEURAL ONCE
Status: CANCELLED | OUTPATIENT
Start: 2020-02-10 | End: 2020-02-10

## 2020-02-10 RX ORDER — TRAMADOL HYDROCHLORIDE 50 MG/1
50 TABLET ORAL EVERY 6 HOURS PRN
Qty: 7 TABLET | Refills: 0 | Status: SHIPPED | OUTPATIENT
Start: 2020-02-10 | End: 2020-02-13

## 2020-02-10 RX ORDER — FAMOTIDINE 10 MG/ML
INJECTION INTRAVENOUS
Status: COMPLETED
Start: 2020-02-10 | End: 2020-02-10

## 2020-02-10 RX ORDER — CEFAZOLIN SODIUM 2 G/50ML
SOLUTION INTRAVENOUS
Status: COMPLETED
Start: 2020-02-10 | End: 2020-02-10

## 2020-02-10 RX ADMIN — ONDANSETRON 4 MG: 2 INJECTION INTRAMUSCULAR; INTRAVENOUS at 08:02

## 2020-02-10 RX ADMIN — MORPHINE SULFATE 2 MG: 4 INJECTION, SOLUTION INTRAMUSCULAR; INTRAVENOUS at 10:02

## 2020-02-10 RX ADMIN — PROPOFOL 200 MG: 10 INJECTION, EMULSION INTRAVENOUS at 08:02

## 2020-02-10 RX ADMIN — CEFAZOLIN SODIUM 2 G: 2 SOLUTION INTRAVENOUS at 08:02

## 2020-02-10 RX ADMIN — MIDAZOLAM HYDROCHLORIDE 2 MG: 1 INJECTION, SOLUTION INTRAMUSCULAR; INTRAVENOUS at 08:02

## 2020-02-10 RX ADMIN — SODIUM CHLORIDE, POTASSIUM CHLORIDE, SODIUM LACTATE AND CALCIUM CHLORIDE: 600; 310; 30; 20 INJECTION, SOLUTION INTRAVENOUS at 08:02

## 2020-02-10 RX ADMIN — DEXAMETHASONE SODIUM PHOSPHATE 4 MG: 4 INJECTION, SOLUTION INTRAMUSCULAR; INTRAVENOUS at 08:02

## 2020-02-10 RX ADMIN — FAMOTIDINE 20 MG: 10 INJECTION INTRAVENOUS at 07:02

## 2020-02-10 RX ADMIN — MEPERIDINE HYDROCHLORIDE 50 MG: 50 INJECTION INTRAMUSCULAR; INTRAVENOUS; SUBCUTANEOUS at 08:02

## 2020-02-10 RX ADMIN — MORPHINE SULFATE 2 MG: 4 INJECTION, SOLUTION INTRAMUSCULAR; INTRAVENOUS at 09:02

## 2020-02-10 NOTE — ANESTHESIA POSTPROCEDURE EVALUATION
Anesthesia Post Evaluation    Patient: Tania Smith    Procedure(s) Performed: Procedure(s) (LRB):  DILATION, URETHRA (N/A)    Final Anesthesia Type: general    Patient location during evaluation: PACU  Patient participation: Yes- Able to Participate  Level of consciousness: awake and alert  Post-procedure vital signs: reviewed and stable  Pain management: adequate  Airway patency: patent    PONV status at discharge: No PONV  Anesthetic complications: no      Cardiovascular status: blood pressure returned to baseline  Respiratory status: unassisted  Hydration status: euvolemic  Follow-up not needed.          Vitals Value Taken Time   /76 2/10/2020 10:52 AM   Temp 36.6 °C (97.9 °F) 2/10/2020  9:40 AM   Pulse 71 2/10/2020 10:46 AM   Resp 20 2/10/2020 10:46 AM   SpO2 99 % 2/10/2020 10:46 AM   Vitals shown include unvalidated device data.      Event Time     Out of Recovery 10:30:00          Pain/Tate Score: Pain Rating Prior to Med Admin: 7 (2/10/2020 10:07 AM)  Tate Score: 10 (2/10/2020  9:55 AM)

## 2020-02-10 NOTE — DISCHARGE INSTRUCTIONS
Urethral Stricture    The urethra is the passage that carries urine out of the body. In a woman, the opening of the urethra is in front of the vagina. In a man, it is at the tip of the penis. Narrowing or blockage of the urethra is known as a urethral stricture. This is more common in men than in women.  Causes  A blockage of the urethra is usually caused by the formation of scar tissue due to surgery, sexually transmitted diseases, or long-term catheter use. The cause can also be unknown.  Symptoms  The symptoms of urethral stricture include:  · Slow urine flow or a urine stream that is split or a spray  · Urine leakage or dribbling (incontinence)  · Inability to empty bladder completely  · Pain when urinating or pain in the pelvis or lower abdomen  · Frequent urge to urinate  · Blood in the urine  · Urinary tract infection  Treatment  If your stricture is severe, a tube (catheter) may be inserted into your urethra to drain your bladder. This should provide you with temporary relief. Without treatment, strictures almost always come back. You will need to follow up with a urologist (a doctor who specializes in diseases of the urinary tract) to determine the best treatment for your condition.  Home care  · If you were given antibiotics, take them until they are used up, or your healthcare provider tells you to stop. It is important to finish the antibiotics even though you feel better. This is to make sure your infection has cleared.  · If a catheter was inserted, follow the instructions provided for catheter care.  Follow-up care  Follow up with your healthcare provider, or as advised.  When to seek medical advice  Call your healthcare provider right away if any of these occur:  · Fever of 100.4ºF (38ºC) or higher, or as directed by your healthcare provider  · Bladder pain or fullness  · Abdominal pain or swelling  · Nausea or vomiting  · Back pain  · Weakness, dizziness, or fainting  Date Last Reviewed:  7/26/2015  © 2222-5592 [employer]. [address] [place] [address], [place] [address]. All rights reserved. This information is not intended as a substitute for professional medical care. Always follow your healthcare professional's instructions.        Correa Catheter Care    A Correa catheter is a rubber tube that is placed through the urethra (opening where urine comes out) and into the bladder. This helps drain urine from the bladder. There is a small balloon on the end of the tube that is inflated after insertion. This keeps the catheter from sliding out of the bladder.  A Correa catheter is used to treat urinary retention (unable to pass urine). It is also used when there is incontinence (loss of bladder control).  Home care  · Finish taking any prescribed antibiotic even if you are feeling better before then.  · It is important to keep bacteria from getting into the collection bag. Do not disconnect the catheter from the collection bag.  · Use a leg band to secure the drainage tube, so it does not pull on the catheter. Drain the collection bag when it becomes full using the drain spout at the bottom of the bag.  · Do not try to pull or remove your catheter. This will injure your urethra. It must be removed by your healthcare provider or nurse.  Follow-up care  Follow up with your healthcare provider as advised for repeat urine testing and catheter removal or replacement.  When to seek medical advice  Call your healthcare provider right away if any of these occur:  · Fever of 100.4ºF (38ºC) or higher, or as directed by your healthcare provider  · Bladder pain or fullness  · Abdominal swelling, nausea or vomiting, or back pain  · Blood or urine leakage around the catheter  · Bloody urine coming from the catheter (if a new symptom)  · Catheter falls out  · Catheter stops draining for 6 hours  · Weakness, dizziness, or fainting  Date Last Reviewed: 10/1/2016  © 7331-4590 [employer]. [address] [place] [address]  [place] [address]. All rights reserved. This information is not intended as a substitute for professional medical care. Always follow your healthcare professional's instructions.

## 2020-02-10 NOTE — INTERVAL H&P NOTE
The patient has been examined and the H&P has been reviewed:    I concur with the findings and no changes have occurred since H&P was written.    Anesthesia/Surgery risks, benefits and alternative options discussed and understood by patient/family.          Active Hospital Problems    Diagnosis  POA    Stricture of female urethra [N35.92]  Yes      Resolved Hospital Problems   No resolved problems to display.

## 2020-02-10 NOTE — ANESTHESIA PREPROCEDURE EVALUATION
02/10/2020  Tania Smith is a 44 y.o., female.    Anesthesia Evaluation    I have reviewed the Patient Summary Reports.    I have reviewed the Nursing Notes.   I have reviewed the Medications.     Review of Systems  Social:  Smoker    Hematology/Oncology:  Hematology Normal   Oncology Normal     EENT/Dental:EENT/Dental Normal   Pulmonary:  Pulmonary Normal    Renal/:  Renal/ Normal     Hepatic/GI:  Hepatic/GI Normal    Musculoskeletal:  Musculoskeletal Normal    Dermatological:  Skin Normal    Psych:  Psychiatric Normal           Physical Exam  General:  Well nourished    Airway/Jaw/Neck:  Airway Findings: Mouth Opening: Normal Tongue: Normal  General Airway Assessment: Adult  Mallampati: II  TM Distance: 4 - 6 cm       Chest/Lungs:  Chest/Lungs Findings: Clear to auscultation     Heart/Vascular:  Heart Findings: Rate: Normal  Rhythm: Regular Rhythm        Mental Status:  Mental Status Findings:  Cooperative, Alert and Oriented         Anesthesia Plan  Type of Anesthesia, risks & benefits discussed:  Anesthesia Type:  general  Patient's Preference:   Intra-op Monitoring Plan: standard ASA monitors  Intra-op Monitoring Plan Comments:   Post Op Pain Control Plan: IV/PO Opioids PRN  Post Op Pain Control Plan Comments:   Induction:   IV  Beta Blocker:  Patient is not currently on a Beta-Blocker (No further documentation required).       Informed Consent: Patient understands risks and agrees with Anesthesia plan.  Questions answered. Anesthesia consent signed with patient.  ASA Score: 2     Day of Surgery Review of History & Physical: I have interviewed and examined the patient. I have reviewed the patient's H&P dated:            Ready For Surgery From Anesthesia Perspective.

## 2020-02-10 NOTE — TRANSFER OF CARE
"Anesthesia Transfer of Care Note    Patient: Tania Smith    Procedure(s) Performed: Procedure(s) (LRB):  DILATION, URETHRA (N/A)    Patient location: PACU    Anesthesia Type: general    Transport from OR: Transported from OR on room air with adequate spontaneous ventilation    Post pain: adequate analgesia    Post assessment: no apparent anesthetic complications and tolerated procedure well    Post vital signs: stable    Level of consciousness: sedated and responds to stimulation    Nausea/Vomiting: no nausea/vomiting    Complications: none    Transfer of care protocol was followed      Last vitals:   Visit Vitals  /76 (BP Location: Right arm, Patient Position: Lying)   Pulse 67   Temp 36.8 °C (98.2 °F) (Oral)   Resp 12   Ht 5' 9" (1.753 m)   Wt 101.2 kg (223 lb)   LMP  (LMP Unknown)   SpO2 97%   Breastfeeding? No   BMI 32.93 kg/m²     "

## 2020-02-10 NOTE — OP NOTE
[facility]  Operative/Discharge Note    Date: 02/10/2020    Pre-Op Diagnosis: Urethral stricture    Post-Op Diagnosis: Same    Procedure(s) Performed:   1.  Cystoscopy  2.  Urethral dilation  3.  18 Nepali [place] tip dunaway     Specimen(s): Urine culture    Staff Surgeon: Cristiano Garcia MD    Assistant Surgeon: Cristiano Garcia Jr, MD    Anesthesia: General    Indications: Tania Smith is a 44 y.o. female with  urethral stricture.      Patient with a several year history of urethral stricture s/p urethral dilation most recently in 2015. She states that she had been self-dilating at home, but her dilator was thrown away inadvertently.      She now complains of symptoms of stricture recurrence. Patient reports weak stream, incomplete emptying and recurrent urinary tract infections.       Patient does not know what caused her stricture, but states it began after the complicated birth of her son.      She denies any fever, chills, flank pain, dysuria, gross hematuria, bone pain, weight loss, recent  trauma or history of  malignancy.     Findings: Pin-point urethra with placement of a motion wire through opening. Dilated from 14 Nepali to 24 Nepali (pictures of urethra post-dilation in media). 18 Nepali [place] tip subsequently placed over wire.     Estimated Blood Loss: min    Drains: None    Procedure in Detail:  After risks, benefits and possible complications of cystoscopy were explained, the patient elected to undergo the procedure and informed consent was obtained. All questions were answered in the ray-operative area. The patient was transferred to the cystoscopy suite and placed in the supine position.  SCDs were applied and working.  General anesthesia was administered.  Once the patient was adequately sedated, she was placed in the dorsal lithotomy position and prepped and draped in the usual sterile fashion.  Time out was performed, ray-procedural antibiotics were confirmed.     A flexible cystoscope was  attempted to be passed into the urethra. However, upon placing the scope at the meatus, it appeared to only have a pin-point opening that would not allow passage of the scope. A motion wire was then placed through the scope into the bladder. The renal Amplatz dilators were then used to dilate from 14 French to 24 French. The stricture felt soft.     The cystoscope was then advanced into the bladder. The right and left ureteral orifices were identified in the normal anatomic position and were seen effluxing clear urine.  Formal cystoscopy was performed which revealed no masses or lesions suspicious for malignancy, mild trabeculations, no bladder stones and no bladder diverticuli.  An 18 French [place] tip catheter was placed over the wire into the bladder.     The patient tolerated the procedure well and was transferred to recovery in stable condition.    Disposition:  The patient will follow up with me in 1 week.    Discharge home today status post uncomplicated procedure as above  Diet - resume home diet  Follow up: Return to clinic in 1 week for catheter removal. Patient given Bactrim, pyridium and tramadol for home.   Instructions: She will likely require referral for formal urethroplasty given the amount of strictures she has had requiring dilations (insurance is [employer]).   Meds:     Medication List      START taking these medications    phenazopyridine 200 MG tablet  Commonly known as:  PYRIDIUM  Take 1 tablet (200 mg total) by mouth 3 (three) times daily with meals. for 3 days     sulfamethoxazole-trimethoprim 800-160mg 800-160 mg Tab  Commonly known as:  BACTRIM DS  Take 1 tablet by mouth 2 (two) times daily. for 3 days     traMADol 50 mg tablet  Commonly known as:  ULTRAM  Take 1 tablet (50 mg total) by mouth every 6 (six) hours as needed for Pain.        CONTINUE taking these medications    buPROPion 150 MG TB24 tablet  Commonly known as:  WELLBUTRIN XL  Take 1 tablet (150 mg total) by  mouth once daily.     * clobetasoL 0.05 % external solution  Commonly known as:  TEMOVATE  Apply topically 2 (two) times daily.     * clobetasoL 0.05 % cream  Commonly known as:  TEMOVATE  Apply topically 2 (two) times daily.     ibuprofen 800 MG tablet  Commonly known as:  ADVIL,MOTRIN         * This list has 2 medication(s) that are the same as other medications prescribed for you. Read the directions carefully, and ask your doctor or other care provider to review them with you.               Where to Get Your Medications      These medications were sent to [id number] - [place] [id number] AT [facility] [address]  [address] [place] [address] [place] [address]    Phone:  [phone]   · phenazopyridine 200 MG tablet  · sulfamethoxazole-trimethoprim 800-160mg 800-160 mg Tab  · traMADol 50 mg tablet         Cristiano Garcia Jr, MD

## 2020-02-10 NOTE — PLAN OF CARE
Replace dunaway bag with Leg bag, pt tolerated well, reviewed dunaway cath instructions pt verbalized and demonstrate understanding

## 2020-02-11 ENCOUNTER — TELEPHONE (OUTPATIENT)
Dept: UROLOGY | Facility: CLINIC | Age: 45
End: 2020-02-11

## 2020-02-11 RX ORDER — PHENAZOPYRIDINE HYDROCHLORIDE 200 MG/1
200 TABLET, FILM COATED ORAL
Qty: 15 TABLET | Refills: 0 | Status: SHIPPED | OUTPATIENT
Start: 2020-02-11 | End: 2020-02-16

## 2020-02-11 NOTE — TELEPHONE ENCOUNTER
Spoke w pt states she would liek a refill on pyrdium/ ibuprofen states still having spasming and pyrdium works well.    Please advise

## 2020-02-11 NOTE — TELEPHONE ENCOUNTER
----- Message from Lexis Douglas sent at 2/11/2020  1:40 PM CST -----  Contact: patient  Called for refill of phenazopyridine (PYRIDIUM) 200 MG tablet and pain medication.         She can be reached at [phone]    Thanks  KB

## 2020-02-17 ENCOUNTER — OFFICE VISIT (OUTPATIENT)
Dept: UROLOGY | Facility: CLINIC | Age: 45
End: 2020-02-17
Payer: COMMERCIAL

## 2020-02-17 VITALS
BODY MASS INDEX: 32.58 KG/M2 | HEIGHT: 69 IN | DIASTOLIC BLOOD PRESSURE: 82 MMHG | SYSTOLIC BLOOD PRESSURE: 134 MMHG | RESPIRATION RATE: 14 BRPM | WEIGHT: 220 LBS | HEART RATE: 74 BPM | TEMPERATURE: 99 F

## 2020-02-17 DIAGNOSIS — N35.82 OTHER STRICTURE OF URETHRA IN FEMALE: Primary | ICD-10-CM

## 2020-02-17 LAB
BILIRUB SERPL-MCNC: NORMAL MG/DL
BLOOD URINE, POC: NORMAL
COLOR, POC UA: NORMAL
GLUCOSE UR QL STRIP: NORMAL
KETONES UR QL STRIP: NORMAL
LEUKOCYTE ESTERASE URINE, POC: NORMAL
NITRITE, POC UA: NORMAL
PH, POC UA: 5
PROTEIN, POC: NORMAL
SPECIFIC GRAVITY, POC UA: 1.03
UROBILINOGEN, POC UA: NORMAL

## 2020-02-17 PROCEDURE — 99214 OFFICE O/P EST MOD 30 MIN: CPT | Mod: 25,S$GLB,, | Performed by: UROLOGY

## 2020-02-17 PROCEDURE — 81002 POCT URINE DIPSTICK WITHOUT MICROSCOPE: ICD-10-PCS | Mod: S$GLB,,, | Performed by: UROLOGY

## 2020-02-17 PROCEDURE — 81002 URINALYSIS NONAUTO W/O SCOPE: CPT | Mod: S$GLB,,, | Performed by: UROLOGY

## 2020-02-17 PROCEDURE — 99999 PR PBB SHADOW E&M-EST. PATIENT-LVL III: CPT | Mod: PBBFAC,,, | Performed by: UROLOGY

## 2020-02-17 PROCEDURE — 99999 PR PBB SHADOW E&M-EST. PATIENT-LVL III: ICD-10-PCS | Mod: PBBFAC,,, | Performed by: UROLOGY

## 2020-02-17 PROCEDURE — 99214 PR OFFICE/OUTPT VISIT, EST, LEVL IV, 30-39 MIN: ICD-10-PCS | Mod: 25,S$GLB,, | Performed by: UROLOGY

## 2020-02-17 NOTE — PROGRESS NOTES
[facility] Established Patient/H&P:    Tania Smith is a 44 y.o. female who presents for follow-up for urethral stricture.    Patient with a several year history of urethral stricture s/p cystoscopy and urethral dilation on 2/10/20 (severe stricture - 2 Chilean). Several dilations in the past.     She states that she had been self-dilating at home, but her dilator was thrown away inadvertently.      Patient does not know what caused her stricture, but states it began after the complicated birth of her son.        Interval History    2/17/20: Patient states she removed her catheter at home on 2/12/20. Doing well. Had hematuria that has since resolved. She feels as if she is now emptying well, but is extremely scared that her stricture will recur again.       She denies any fever, chills, flank pain, dysuria, gross hematuria, bone pain, weight loss, recent  trauma or history of  malignancy.     PVR  25 mL  2/17/20    Past Medical History:   Diagnosis Date    Neuromuscular disorder     Thyroid disease        Past Surgical History:   Procedure Laterality Date    CHOLECYSTECTOMY      CYSTOSCOPY N/A 2/10/2020    Procedure: CYSTOSCOPY;  Surgeon: Cristiano Garcia Jr., MD;  Location: [facility] OR;  Service: Urology;  Laterality: N/A;    DILATION OF URETHRA N/A 2/10/2020    Procedure: DILATION, URETHRA;  Surgeon: Cristiano Garcia Jr., MD;  Location: [facility] OR;  Service: Urology;  Laterality: N/A;    HYSTERECTOMY  2010    partial - still has ovaries    URETHRA SURGERY  2017       Family History   Problem Relation Age of Onset    Hypertension Mother     Diabetes Mother     Hepatitis Mother     Parkinsonism Father     Throat cancer Father     Parkinsonism Sister     Crohn's disease Sister     Breast cancer Neg Hx        Social History     Socioeconomic History    Marital status: Single     Spouse name: Not on file    Number of children: Not on file    Years of education: Not on file    Highest  education level: Not on file   Occupational History    Not on file   Social Needs    Financial resource strain: Not on file    Food insecurity:     Worry: Not on file     Inability: Not on file    Transportation needs:     Medical: Not on file     Non-medical: Not on file   Tobacco Use    Smoking status: Current Every Day Smoker     Packs/day: 1.00    Smokeless tobacco: Never Used   Substance and Sexual Activity    Alcohol use: Yes     Comment: socially     Drug use: No    Sexual activity: Yes     Partners: Male     Birth control/protection: See Surgical Hx   Lifestyle    Physical activity:     Days per week: Not on file     Minutes per session: Not on file    Stress: Not on file   Relationships    Social connections:     Talks on phone: Not on file     Gets together: Not on file     Attends Jain service: Not on file     Active member of club or organization: Not on file     Attends meetings of clubs or organizations: Not on file     Relationship status: Not on file   Other Topics Concern    Not on file   Social History Narrative    Not on file       Review of patient's allergies indicates:   Allergen Reactions    Codeine Rash       Medications Reviewed: see MAR    ROS:    Constitutional: denies fevers, chills, night sweats, fatigue, malaise  Respiratory: negative for cough, shortness of breath, wheezing, dyspnea.  Cardiovascular: - for high blood pressure, negative for chest pain, varicose veins, ankle swelling, palpitations, syncope.  GI: negative for abdominal pain, heartburn, indigestion, nausea, vomiting, constipation, diarrhea, blood in stool.   Urology: as noted above in HPI  Endocrinology: negative for cold intolerance, excessive thirst, not feeling tired/sluggish, no heat intolerance.   Hematology/Lymph: negative for easy bleeding, easy bruising, swollen glands.  Musculoskeletal: negative for back pain, joint pain, joint swelling, neck pain.  Allergy-Immunology: negative for seasonal  "allergies, negative for unusual infections.   Skin: negative for boils, breast lumps, hives, itching, rash.   Neurology: negative for, dizziness, headache, tingling/numbness, tremors.   Psych: satisfied with life; negative for, anxiety, depression, suicidal thoughts.     PHYSICAL EXAM:    Vitals:    02/17/20 1446   BP: 134/82   Pulse: 74   Resp: 14   Temp: 98.6 °F (37 °C)     Body mass index is 32.49 kg/m². Weight: 99.8 kg (220 lb) Height: 5' 9" (175.3 cm)       General: Alert, cooperative, no distress, appears stated age  Head: Normocephalic, without obvious abnormality, atraumatic  Neck: no masses, no thyromegaly, no lymphadenopathy  Eyes: PERRL, conjunctiva/corneas clear  Lungs: Respirations unlabored, normal effort, no accessory muscle use  CV: Warm and well perfused extremities  Abdomen: Soft, non-tender, no CVA tenderness, no hepatosplenomegaly, no hernia  Extremities: Extremities normal, atraumatic, no cyanosis or edema  Skin: Normal color, texture, and turgor, no rashes or lesions  Psych: Appropriate, well oriented, normal affect, normal mood  Neuro: Non-focal      LABS:    Recent Results (from the past 336 hour(s))   Urine culture    Collection Time: 02/03/20  3:37 PM   Result Value Ref Range    Urine Culture, Routine       Multiple organisms isolated. None in predominance.  Repeat if    Urine Culture, Routine clinically necessary.    POCT URINE DIPSTICK WITHOUT MICROSCOPE    Collection Time: 02/03/20  4:47 PM   Result Value Ref Range    Color, UA yellow/ clear     Spec Grav UA 1.025     pH, UA 7     WBC, UA negative     Nitrite, UA negative     Protein negative     Glucose, UA negative     Ketones, UA negative     Urobilinogen, UA negative     Bilirubin negative     Blood, UA negative    Basic metabolic panel    Collection Time: 02/03/20  6:01 PM   Result Value Ref Range    Sodium 140 136 - 145 mmol/L    Potassium 4.3 3.5 - 5.1 mmol/L    Chloride 107 95 - 110 mmol/L    CO2 23 23 - 29 mmol/L    Glucose 99 " 70 - 110 mg/dL    BUN, Bld 13 6 - 20 mg/dL    Creatinine 0.8 0.5 - 1.4 mg/dL    Calcium 9.1 8.7 - 10.5 mg/dL    Anion Gap 10 8 - 16 mmol/L    eGFR if African American >60.0 >60 mL/min/1.73 m^2    eGFR if non African American >60.0 >60 mL/min/1.73 m^2   CBC auto differential    Collection Time: 02/03/20  6:01 PM   Result Value Ref Range    WBC 7.91 3.90 - 12.70 K/uL    RBC 4.49 4.00 - 5.40 M/uL    Hemoglobin 13.8 12.0 - 16.0 g/dL    Hematocrit 41.9 37.0 - 48.5 %    Mean Corpuscular Volume 93 82 - 98 fL    Mean Corpuscular Hemoglobin 30.7 27.0 - 31.0 pg    Mean Corpuscular Hemoglobin Conc 32.9 32.0 - 36.0 g/dL    RDW 12.9 11.5 - 14.5 %    Platelets 314 150 - 350 K/uL    MPV 10.1 9.2 - 12.9 fL    Immature Granulocytes 0.3 0.0 - 0.5 %    Gran # (ANC) 4.2 1.8 - 7.7 K/uL    Immature Grans (Abs) 0.02 0.00 - 0.04 K/uL    Lymph # 2.8 1.0 - 4.8 K/uL    Mono # 0.6 0.3 - 1.0 K/uL    Eos # 0.2 0.0 - 0.5 K/uL    Baso # 0.06 0.00 - 0.20 K/uL    nRBC 0 0 /100 WBC    Gran% 53.4 38.0 - 73.0 %    Lymph% 35.4 18.0 - 48.0 %    Mono% 7.3 4.0 - 15.0 %    Eosinophil% 2.8 0.0 - 8.0 %    Basophil% 0.8 0.0 - 1.9 %    Differential Method Automated    Lipid panel    Collection Time: 02/03/20  6:01 PM   Result Value Ref Range    Cholesterol 210 (H) 120 - 199 mg/dL    Triglycerides 155 (H) 30 - 150 mg/dL    HDL 34 (L) 40 - 75 mg/dL    LDL Cholesterol 145.0 63.0 - 159.0 mg/dL    Hdl/Cholesterol Ratio 16.2 (L) 20.0 - 50.0 %    Total Cholesterol/HDL Ratio 6.2 (H) 2.0 - 5.0    Non-HDL Cholesterol 176 mg/dL   TSH    Collection Time: 02/03/20  6:01 PM   Result Value Ref Range    TSH 2.878 0.340 - 5.600 uIU/mL   T4, free    Collection Time: 02/03/20  6:01 PM   Result Value Ref Range    Free T4 0.83 0.71 - 1.51 ng/dL   Urinalysis, Reflex to Urine Culture Urine, Unspecified (cystoscope)    Collection Time: 02/10/20  9:36 AM   Result Value Ref Range    Specimen UA Urine, Clean Catch     Color, UA Yellow Yellow, Straw, Lanie    Appearance, UA Clear Clear     pH, UA 5.0 5.0 - 8.0    Specific Gravity, UA 1.020 1.005 - 1.030    Protein, UA Negative Negative    Glucose, UA Negative Negative    Ketones, UA Negative Negative    Bilirubin (UA) Negative Negative    Occult Blood UA 2+ (A) Negative    Nitrite, UA Negative Negative    Urobilinogen, UA Negative Negative EU/dL    Leukocytes, UA Trace (A) Negative   Urinalysis Microscopic    Collection Time: 02/10/20  9:36 AM   Result Value Ref Range    RBC, UA 40 (H) 0 - 4 /hpf    WBC, UA 5 0 - 5 /hpf    Bacteria Few (A) None-Occ /hpf    Squam Epithel, UA 4 /hpf    Microscopic Comment SEE COMMENT        IMAGING:    No recent urologic imaging      Assessment/Diagnosis:    1. Other stricture of urethra in female         Plans:    - I spent 25 minutes with the patient; more than 50% was in counseling about the disease process and methods of treatment. Extensive discussion with patient regarding management of her recurrent, severe urethral stricture s/p dilation on 2/10/20. Etiology is unclear, but explained that patient would benefit from referral for definitive management.   - Referral to Dr. Remy Kendall at [facility] to discuss possible urethroplasty for her recurrent stricture- records faxed.   - Strict ER return precautions given to patient.

## 2020-02-19 ENCOUNTER — PATIENT OUTREACH (OUTPATIENT)
Dept: ADMINISTRATIVE | Facility: HOSPITAL | Age: 45
End: 2020-02-19

## 2020-02-28 ENCOUNTER — HOSPITAL ENCOUNTER (OUTPATIENT)
Dept: RADIOLOGY | Facility: HOSPITAL | Age: 45
Discharge: HOME | End: 2020-02-28
Attending: FAMILY MEDICINE
Payer: COMMERCIAL

## 2020-02-28 VITALS — HEIGHT: 69 IN | BODY MASS INDEX: 32.58 KG/M2 | WEIGHT: 220 LBS

## 2020-02-28 DIAGNOSIS — Z12.31 ENCOUNTER FOR SCREENING MAMMOGRAM FOR BREAST CANCER: ICD-10-CM

## 2020-02-28 PROCEDURE — 77067 MAMMO DIGITAL SCREENING BILAT WITH TOMOSYNTHESIS_CAD: ICD-10-PCS | Mod: 26,,, | Performed by: RADIOLOGY

## 2020-02-28 PROCEDURE — 77067 SCR MAMMO BI INCL CAD: CPT | Mod: TC

## 2020-02-28 PROCEDURE — 77067 SCR MAMMO BI INCL CAD: CPT | Mod: 26,,, | Performed by: RADIOLOGY

## 2020-02-28 PROCEDURE — 77063 BREAST TOMOSYNTHESIS BI: CPT | Mod: 26,,, | Performed by: RADIOLOGY

## 2020-02-28 PROCEDURE — 77063 MAMMO DIGITAL SCREENING BILAT WITH TOMOSYNTHESIS_CAD: ICD-10-PCS | Mod: 26,,, | Performed by: RADIOLOGY

## 2020-03-04 ENCOUNTER — LAB VISIT (OUTPATIENT)
Dept: LAB | Facility: HOSPITAL | Age: 45
End: 2020-03-04
Attending: FAMILY MEDICINE
Payer: COMMERCIAL

## 2020-03-04 ENCOUNTER — OFFICE VISIT (OUTPATIENT)
Dept: FAMILY MEDICINE | Facility: CLINIC | Age: 45
End: 2020-03-04
Payer: COMMERCIAL

## 2020-03-04 VITALS
RESPIRATION RATE: 15 BRPM | WEIGHT: 218 LBS | DIASTOLIC BLOOD PRESSURE: 67 MMHG | HEART RATE: 81 BPM | OXYGEN SATURATION: 96 % | SYSTOLIC BLOOD PRESSURE: 110 MMHG | HEIGHT: 69 IN | BODY MASS INDEX: 32.29 KG/M2 | TEMPERATURE: 98 F

## 2020-03-04 DIAGNOSIS — R10.84 GENERALIZED ABDOMINAL PAIN: Primary | ICD-10-CM

## 2020-03-04 DIAGNOSIS — G47.9 SLEEP DISTURBANCE: ICD-10-CM

## 2020-03-04 DIAGNOSIS — R19.7 DIARRHEA, UNSPECIFIED TYPE: ICD-10-CM

## 2020-03-04 DIAGNOSIS — R19.4 BOWEL HABIT CHANGES: ICD-10-CM

## 2020-03-04 DIAGNOSIS — N95.1 MENOPAUSAL SYMPTOMS: ICD-10-CM

## 2020-03-04 PROCEDURE — 36415 COLL VENOUS BLD VENIPUNCTURE: CPT

## 2020-03-04 PROCEDURE — 83001 ASSAY OF GONADOTROPIN (FSH): CPT

## 2020-03-04 PROCEDURE — 99214 PR OFFICE/OUTPT VISIT, EST, LEVL IV, 30-39 MIN: ICD-10-PCS | Mod: S$GLB,,, | Performed by: FAMILY MEDICINE

## 2020-03-04 PROCEDURE — 99214 OFFICE O/P EST MOD 30 MIN: CPT | Mod: S$GLB,,, | Performed by: FAMILY MEDICINE

## 2020-03-04 RX ORDER — TRAZODONE HYDROCHLORIDE 50 MG/1
50-100 TABLET ORAL NIGHTLY
Qty: 60 TABLET | Refills: 11 | Status: SHIPPED | OUTPATIENT
Start: 2020-03-04 | End: 2021-03-04

## 2020-03-04 NOTE — PROGRESS NOTES
"  [facility] - Clinic Note    Subjective      Ms. Smith is a 44 y.o. female who presents to clinic for follow up.    Hot flashes. Worse. Feels terrible. Sweaty. Happens even when it isn't hot. Been on and off for bout two years but worse in the last 1-2 months. Does not have a uterus but does have ovaries. Has been cranky/allen. Having diarrhea often. Has IBS maybe? Has to go to the bathroom a lot. Has been going on for about several months, maybe longer. Had her gallbladder in 2017. No hematochezia/melena. Generalized abdominal pain    Sleep disturbance  Is taking melatonin, Advil PM.    PMH Tania has a past medical history of Neuromuscular disorder and Thyroid disease.   PSXH Tania has a past surgical history that includes Cholecystectomy; Urethra surgery (2017); Hysterectomy (2010); Dilation of urethra (N/A, 2/10/2020); and Cystoscopy (N/A, 2/10/2020).    Tania's family history includes Crohn's disease in her sister; Diabetes in her mother; Hepatitis in her mother; Hypertension in her mother; Parkinsonism in her father and sister; Throat cancer in her father.   YAZMIN Marin reports that she has been smoking. She has been smoking about 1.00 pack per day. She has never used smokeless tobacco. She reports that she drinks alcohol. She reports that she does not use drugs.   MIKE Marin is allergic to codeine.   RAJANI Marin has a current medication list which includes the following prescription(s): bupropion, clobetasol, clobetasol, diphenhydramine-acetaminophen, and trazodone.     Review of Systems   Gastrointestinal: Positive for abdominal distention, abdominal pain, diarrhea and nausea. Negative for anal bleeding, blood in stool, constipation and vomiting.   Endocrine: Positive for heat intolerance.     ROS otherwise negative  Objective     /67 (BP Location: Right arm, Patient Position: Sitting, BP Method: X-Large (Automatic))   Pulse 81   Temp 97.7 °F (36.5 °C) (Oral)   Resp 15   Ht 5' 9" (1.753 " m)   Wt 98.9 kg (218 lb)   LMP  (LMP Unknown)   SpO2 96%   BMI 32.19 kg/m²     Physical Exam   Constitutional: She appears well-developed and well-nourished.   HENT:   Head: Normocephalic and atraumatic.   Cardiovascular: Normal rate, regular rhythm, normal heart sounds and intact distal pulses.   No murmur heard.  Pulmonary/Chest: Breath sounds normal. She has no wheezes. She has no rales.   Abdominal: Soft.   Neurological: She is alert.   Skin: Skin is warm and dry.   Psychiatric: She has a normal mood and affect.   Vitals reviewed.     Assessment/Plan     1. Generalized abdominal pain  Ambulatory referral/consult to Gastroenterology   2. Diarrhea, unspecified type  Ambulatory referral/consult to Gastroenterology   3. Bowel habit changes  Ambulatory referral/consult to Gastroenterology   4. Sleep disturbance  traZODone (DESYREL) 50 MG tablet   5. Menopausal symptoms  Follicle stimulating hormone       Decide on hormonal vs other treatment for hot flashes when lab work results  Refer to GI for abdominal pain, bowel habit changes    Nica Ortega MD  Family Medicine  [facility]  [phone]

## 2020-03-05 LAB — FSH SERPL-ACNC: 52.1 MIU/ML

## 2020-03-17 DIAGNOSIS — N95.1 HOT FLASHES DUE TO MENOPAUSE: Primary | ICD-10-CM

## 2020-03-17 RX ORDER — ESTRADIOL 1 MG/1
TABLET ORAL
Qty: 21 TABLET | Refills: 11 | Status: SHIPPED | OUTPATIENT
Start: 2020-03-17

## 2020-04-27 ENCOUNTER — TELEPHONE (OUTPATIENT)
Dept: FAMILY MEDICINE | Facility: CLINIC | Age: 45
End: 2020-04-27

## 2020-04-27 ENCOUNTER — OFFICE VISIT (OUTPATIENT)
Dept: FAMILY MEDICINE | Facility: CLINIC | Age: 45
End: 2020-04-27
Payer: COMMERCIAL

## 2020-04-27 DIAGNOSIS — T23.162A SUPERFICIAL BURN OF BACK OF LEFT HAND, INITIAL ENCOUNTER: Primary | ICD-10-CM

## 2020-04-27 PROCEDURE — 99213 PR OFFICE/OUTPT VISIT, EST, LEVL III, 20-29 MIN: ICD-10-PCS | Mod: 95,,, | Performed by: FAMILY MEDICINE

## 2020-04-27 PROCEDURE — 99213 OFFICE O/P EST LOW 20 MIN: CPT | Mod: 95,,, | Performed by: FAMILY MEDICINE

## 2020-04-27 NOTE — PROGRESS NOTES
[facility] - Telemedicine Clinic Note    Subjective      Ms. Smith is a 45 y.o. female who presents for left hand burn    The patient location is: [place]  Visit type: audiovisual  Each patient to whom he or she provides medical services by telemedicine is:  (1) informed of the relationship between the physician and patient and the respective role of any other health care provider with respect to management of the patient; and (2) notified that he or she may decline to receive medical services by telemedicine and may withdraw from such care at any time.    Left hand burn at thumb  Burned her hand at work on Thursday. Wears gloves at work and has to use bleach.   Burned it on a heat element at work.   Works at [employer].  Informed her employer.   Now is having some soreness and skin peeling  Put burn cream , Antiseptic cream, benzakonlium     Superficial burn   Review of Systems   Constitutional: Negative for fever.   Respiratory: Negative for shortness of breath.    Cardiovascular: Negative for chest pain.   Skin:        Burn to left hand, no bullous, no blister, no surrounding erythema     ROS otherwise negative      PMH Tania has a past medical history of Neuromuscular disorder and Thyroid disease.   PSXH Tania has a past surgical history that includes Cholecystectomy; Urethra surgery (2017); Hysterectomy (2010); Dilation of urethra (N/A, 2/10/2020); and Cystoscopy (N/A, 2/10/2020).   FH Tania's family history includes Crohn's disease in her sister; Diabetes in her mother; Hepatitis in her mother; Hypertension in her mother; Parkinsonism in her father and sister; Throat cancer in her father.   YAZMIN Marin reports that she has been smoking. She has been smoking about 1.00 pack per day. She has never used smokeless tobacco. She reports that she drinks alcohol. She reports that she does not use drugs.   ALG Tania is allergic to codeine.   MED Tania has a current medication list which includes the following  prescription(s): bupropion, clobetasol, clobetasol, diphenhydramine-acetaminophen, estradiol, and trazodone.       Objective     LMP  (LMP Unknown)     Physical Exam   Constitutional: She is oriented to person, place, and time. She appears well-developed and well-nourished. No distress.   HENT:   Head: Normocephalic and atraumatic.   Right Ear: External ear normal.   Left Ear: External ear normal.   Nose: Nose normal.   Eyes: Conjunctivae and EOM are normal.   Pulmonary/Chest: Effort normal.   Neurological: She is alert and oriented to person, place, and time.   Skin: She is not diaphoretic.   Darkened appearance of the left hand c/w burn, no apparent blistering, no open skin, appears to be superficial thickness burn   Psychiatric: She has a normal mood and affect. Her behavior is normal. Judgment and thought content normal.      Assessment/Plan     1. Superficial burn of back of left hand, initial encounter       Continue current wound care  Do not expose burn to harsh elements or chemicals    Future Appointments   Date Time Provider Department Center   6/8/2020  8:00 AM Nica Ortega MD [facility]       Nica Ortega MD  Family Medicine  [facility]  [phone]

## 2020-04-27 NOTE — TELEPHONE ENCOUNTER
----- Message from Marycarmen Rolon sent at 4/27/2020  9:34 AM CDT -----  Name of Caller -self  Reason for Visit/Symptoms-left hand burn Best Contact Number or Confirm if Mychart [phone]  Preferred Date/Time of Appointment-  Interested in Virtual Visit (yes/no)  Additional Information- Patient burned her left hand at work. Patient requesting to be seen today.

## 2020-05-20 ENCOUNTER — PATIENT MESSAGE (OUTPATIENT)
Dept: ADMINISTRATIVE | Facility: HOSPITAL | Age: 45
End: 2020-05-20

## 2020-05-25 ENCOUNTER — PATIENT OUTREACH (OUTPATIENT)
Dept: ADMINISTRATIVE | Facility: HOSPITAL | Age: 45
End: 2020-05-25

## 2020-08-03 ENCOUNTER — TELEPHONE (OUTPATIENT)
Dept: UROLOGY | Facility: CLINIC | Age: 45
End: 2020-08-03

## 2020-08-03 NOTE — TELEPHONE ENCOUNTER
Returned patient call regarding UTI symptoms and requesting prescription. Called patient to inform that provider does not prescribe medication with out collecting urine sample and sending it for culture and patient will need to go to the nearest urgent care in [place]. No answer. LMOM to call office back.

## 2020-08-03 NOTE — TELEPHONE ENCOUNTER
----- Message from Marie Adam sent at 8/3/2020 12:38 PM CDT -----  Regarding: refill/UTI/out of town  Contact: Tania smith  Type: Needs Medical Advice  Who Called:  Tania  Symptoms (please be specific):  UTI/urinating blood  How long has patient had these symptoms:  ongoing  Pharmacy name and phone #:    [facility]  [address] [place]  [address] [phone]         Best Call Back Number: [phone]  Additional Information: Pt is out of town and has a UTI pls send her something to pharm listed above

## 2020-08-06 ENCOUNTER — TELEPHONE (OUTPATIENT)
Dept: UROLOGY | Facility: CLINIC | Age: 45
End: 2020-08-06

## 2021-05-14 ENCOUNTER — APPOINTMENT (RX ONLY)
Dept: URBAN - METROPOLITAN AREA CLINIC 124 | Facility: CLINIC | Age: 46
Setting detail: DERMATOLOGY
End: 2021-05-14

## 2021-05-14 DIAGNOSIS — L40.0 PSORIASIS VULGARIS: ICD-10-CM | Status: WORSENING

## 2021-05-14 PROCEDURE — ? ORDER TESTS

## 2021-05-14 PROCEDURE — 99204 OFFICE O/P NEW MOD 45 MIN: CPT

## 2021-05-14 PROCEDURE — ? ADDITIONAL NOTES

## 2021-05-14 PROCEDURE — ? MEDICATION COUNSELING

## 2021-05-14 PROCEDURE — ? COUNSELING

## 2021-05-14 ASSESSMENT — LOCATION DETAILED DESCRIPTION DERM
LOCATION DETAILED: RIGHT CAVUM CONCHA
LOCATION DETAILED: RIGHT INFERIOR CENTRAL MALAR CHEEK
LOCATION DETAILED: LEFT SUPERIOR PARIETAL SCALP
LOCATION DETAILED: LEFT ELBOW
LOCATION DETAILED: RIGHT ELBOW
LOCATION DETAILED: LEFT INFERIOR FOREHEAD
LOCATION DETAILED: RIGHT CENTRAL PARIETAL SCALP
LOCATION DETAILED: LEFT CHIN
LOCATION DETAILED: LEFT PROXIMAL PRETIBIAL REGION
LOCATION DETAILED: RIGHT PROXIMAL PRETIBIAL REGION
LOCATION DETAILED: LEFT ANTERIOR DISTAL THIGH
LOCATION DETAILED: LEFT CAVUM CONCHA
LOCATION DETAILED: LEFT INFERIOR CENTRAL MALAR CHEEK

## 2021-05-14 ASSESSMENT — LOCATION ZONE DERM
LOCATION ZONE: SCALP
LOCATION ZONE: LEG
LOCATION ZONE: EAR
LOCATION ZONE: ARM
LOCATION ZONE: FACE

## 2021-05-14 ASSESSMENT — LOCATION SIMPLE DESCRIPTION DERM
LOCATION SIMPLE: LEFT FOREHEAD
LOCATION SIMPLE: LEFT THIGH
LOCATION SIMPLE: LEFT CHEEK
LOCATION SIMPLE: RIGHT PRETIBIAL REGION
LOCATION SIMPLE: RIGHT CHEEK
LOCATION SIMPLE: LEFT PRETIBIAL REGION
LOCATION SIMPLE: RIGHT ELBOW
LOCATION SIMPLE: LEFT ELBOW
LOCATION SIMPLE: SCALP
LOCATION SIMPLE: LEFT EAR
LOCATION SIMPLE: CHIN
LOCATION SIMPLE: RIGHT EAR

## 2021-05-14 NOTE — PROCEDURE: MEDICATION COUNSELING
Xelpatrickz Pregnancy And Lactation Text: This medication is Pregnancy Category D and is not considered safe during pregnancy.  The risk during breast feeding is also uncertain.

## 2021-05-14 NOTE — PROCEDURE: ADDITIONAL NOTES
Additional Notes: Recommended seeing rheumatologist. Patient has been prescribed topical Clobetasol in the past with minimum relief. Pt also reports being achey in legs and back. Pt will start on Cosentyx once labs come back and paperwork is sent to [facility]. Additional Notes: Recommended seeing rheumatologist. Patient has been prescribed topical Clobetasol in the past with minimum relief. Pt also reports being achey in legs and back. Pt will start on Cosentyx once labs come back and paperwork is sent to [facility] and [employer].

## 2021-07-30 ENCOUNTER — APPOINTMENT (RX ONLY)
Dept: URBAN - METROPOLITAN AREA CLINIC 124 | Facility: CLINIC | Age: 46
Setting detail: DERMATOLOGY
End: 2021-07-30

## 2021-07-30 DIAGNOSIS — Z02.9 ENCOUNTER FOR ADMINISTRATIVE EXAMINATIONS, UNSPECIFIED: ICD-10-CM

## 2021-08-26 ENCOUNTER — APPOINTMENT (RX ONLY)
Dept: URBAN - METROPOLITAN AREA CLINIC 124 | Facility: CLINIC | Age: 46
Setting detail: DERMATOLOGY
End: 2021-08-26

## 2021-08-26 DIAGNOSIS — L40.0 PSORIASIS VULGARIS: ICD-10-CM | Status: INADEQUATELY CONTROLLED

## 2021-08-26 PROCEDURE — ? ADDITIONAL NOTES

## 2021-08-26 PROCEDURE — 96372 THER/PROPH/DIAG INJ SC/IM: CPT

## 2021-08-26 PROCEDURE — ? COUNSELING

## 2021-08-26 PROCEDURE — ? COSENTYX INJECTION

## 2021-08-26 PROCEDURE — ? MEDICATION COUNSELING

## 2021-08-26 ASSESSMENT — LOCATION DETAILED DESCRIPTION DERM
LOCATION DETAILED: RIGHT INFERIOR CENTRAL MALAR CHEEK
LOCATION DETAILED: RIGHT PROXIMAL PRETIBIAL REGION
LOCATION DETAILED: LEFT CHIN
LOCATION DETAILED: RIGHT ANTERIOR DISTAL THIGH
LOCATION DETAILED: RIGHT CENTRAL PARIETAL SCALP
LOCATION DETAILED: LEFT CAVUM CONCHA
LOCATION DETAILED: LEFT SUPERIOR PARIETAL SCALP
LOCATION DETAILED: LEFT ELBOW
LOCATION DETAILED: LEFT PROXIMAL PRETIBIAL REGION
LOCATION DETAILED: RIGHT ELBOW
LOCATION DETAILED: LEFT INFERIOR FOREHEAD
LOCATION DETAILED: LEFT ANTERIOR DISTAL THIGH
LOCATION DETAILED: LEFT INFERIOR CENTRAL MALAR CHEEK
LOCATION DETAILED: RIGHT CAVUM CONCHA

## 2021-08-26 ASSESSMENT — LOCATION ZONE DERM
LOCATION ZONE: LEG
LOCATION ZONE: EAR
LOCATION ZONE: ARM
LOCATION ZONE: FACE
LOCATION ZONE: SCALP

## 2021-08-26 ASSESSMENT — LOCATION SIMPLE DESCRIPTION DERM
LOCATION SIMPLE: RIGHT ELBOW
LOCATION SIMPLE: RIGHT EAR
LOCATION SIMPLE: LEFT ELBOW
LOCATION SIMPLE: LEFT THIGH
LOCATION SIMPLE: LEFT EAR
LOCATION SIMPLE: RIGHT CHEEK
LOCATION SIMPLE: LEFT PRETIBIAL REGION
LOCATION SIMPLE: RIGHT THIGH
LOCATION SIMPLE: RIGHT PRETIBIAL REGION
LOCATION SIMPLE: LEFT FOREHEAD
LOCATION SIMPLE: LEFT CHEEK
LOCATION SIMPLE: SCALP
LOCATION SIMPLE: CHIN

## 2021-08-26 NOTE — PROCEDURE: ADDITIONAL NOTES
Detail Level: Simple
Additional Notes: Pt given weeks 1-4 of dosing = loading dose. Week 0 injection was done in office today.
Render Risk Assessment In Note?: no
Additional Notes: Patient consent was obtained to proceed with the visit and recommended plan of care after discussion of all risks and benefits, including the risks of COVID-19 exposure.

## 2021-08-26 NOTE — PROCEDURE: MEDICATION COUNSELING
Left detailed message for Respiratory Therapy to fax CPAP Downloads to our office per Dr. Brewer request.   Skyrizi Counseling: I discussed with the patient the risks of risankizumab-rzaa including but not limited to immunosuppression, and serious infections.  The patient understands that monitoring is required including a PPD at baseline and must alert us or the primary physician if symptoms of infection or other concerning signs are noted.

## 2021-11-22 ENCOUNTER — APPOINTMENT (RX ONLY)
Dept: URBAN - METROPOLITAN AREA CLINIC 124 | Facility: CLINIC | Age: 46
Setting detail: DERMATOLOGY
End: 2021-11-22

## 2021-11-22 DIAGNOSIS — L40.0 PSORIASIS VULGARIS: ICD-10-CM

## 2021-11-22 PROCEDURE — ? MEDICATION COUNSELING

## 2021-11-22 PROCEDURE — ? ADDITIONAL NOTES

## 2021-11-22 PROCEDURE — 96372 THER/PROPH/DIAG INJ SC/IM: CPT

## 2021-11-22 PROCEDURE — ? COUNSELING

## 2021-11-22 PROCEDURE — ? COSENTYX INJECTION

## 2021-11-22 ASSESSMENT — LOCATION SIMPLE DESCRIPTION DERM
LOCATION SIMPLE: LEFT FOREHEAD
LOCATION SIMPLE: SCALP
LOCATION SIMPLE: LEFT CHEEK
LOCATION SIMPLE: RIGHT EAR
LOCATION SIMPLE: RIGHT CHEEK
LOCATION SIMPLE: LEFT ELBOW
LOCATION SIMPLE: RIGHT ELBOW
LOCATION SIMPLE: RIGHT THIGH
LOCATION SIMPLE: CHIN
LOCATION SIMPLE: LEFT THIGH
LOCATION SIMPLE: LEFT PRETIBIAL REGION
LOCATION SIMPLE: LEFT EAR
LOCATION SIMPLE: RIGHT PRETIBIAL REGION

## 2021-11-22 ASSESSMENT — LOCATION DETAILED DESCRIPTION DERM
LOCATION DETAILED: LEFT CHIN
LOCATION DETAILED: RIGHT PROXIMAL PRETIBIAL REGION
LOCATION DETAILED: RIGHT INFERIOR CENTRAL MALAR CHEEK
LOCATION DETAILED: RIGHT ANTERIOR DISTAL THIGH
LOCATION DETAILED: LEFT SUPERIOR PARIETAL SCALP
LOCATION DETAILED: RIGHT CAVUM CONCHA
LOCATION DETAILED: LEFT ANTERIOR DISTAL THIGH
LOCATION DETAILED: RIGHT ELBOW
LOCATION DETAILED: LEFT CAVUM CONCHA
LOCATION DETAILED: LEFT PROXIMAL PRETIBIAL REGION
LOCATION DETAILED: LEFT ELBOW
LOCATION DETAILED: LEFT INFERIOR FOREHEAD
LOCATION DETAILED: RIGHT CENTRAL PARIETAL SCALP
LOCATION DETAILED: LEFT INFERIOR CENTRAL MALAR CHEEK

## 2021-11-22 ASSESSMENT — LOCATION ZONE DERM
LOCATION ZONE: FACE
LOCATION ZONE: LEG
LOCATION ZONE: EAR
LOCATION ZONE: SCALP
LOCATION ZONE: ARM

## 2021-11-22 NOTE — PROCEDURE: MEDICATION COUNSELING
minimum assist (75% patients effort) Prednisone Pregnancy And Lactation Text: This medication is Pregnancy Category C and it isn't know if it is safe during pregnancy. This medication is excreted in breast milk.

## 2021-11-22 NOTE — PROCEDURE: MEDICATION COUNSELING
Abscess (Incision & Drainage)  An abscess (sometimes called a boil) occurs when bacteria get trapped under the skin and start to grow. Pus forms inside the abscess as the body responds to the bacteria. An abscess can happen with an insect bite, ingrown hair, blocked oil gland, pimple, cyst, or puncture wound.  Your healthcare provider has drained the pus from your abscess. If the abscess pocket was large, your healthcare provider may have inserted gauze packing. Your provider will need to remove and possibly replace it on your next visit. You may not need antibiotics to treat a simple abscess, unless the infection is spreading into the skin around the wound (cellulitis).  Healing of the wound will take about 1 to 2 weeks, depending on the size of the abscess. Healthy tissue will grow from the bottom and sides of the opening until it seals over.  Home care  These tips can help your wound heal:  · The wound may drain for the first 2 days. Cover the wound with a clean dry dressing. If the dressing becomes soaked with blood or pus, change it.  · If a gauze packing was placed inside the abscess cavity, you may be told to remove it yourself. You may do this in the shower. Once the packing is removed, you should wash the area in the shower or bath 3 to 4 times a day, until the skin opening has closed. Make sure you wash your hands after changing the packing or cleaning the wound.  · If you were prescribed antibiotics, take them as directed until they are all gone.  · You may use acetaminophen or ibuprofen to control pain, unless another pain medicine was prescribed. If you have liver disease or ever had a stomach ulcer, talk with your doctor before using these medicines.  Follow-up care  Follow up with your healthcare provider, or as advised. If a gauze packing was inserted in your wound, it should be removed in 1 to 2 days. Check your wound every day for the signs of worsening infection listed below.  When to seek  medical advice  Call your healthcare provider right away if any of these occur:  · Increasing redness or swelling  · Red streaks in the skin leading away from the wound  · Increasing local pain or swelling  · Continued pus draining from the wound 2 days after treatment  · Fever of 100.4ºF (38ºC) or higher, or as directed by your healthcare provider  · Boil returns when you are at home  Date Last Reviewed: 9/1/2016  © 6833-5490 [employer], [employer]. [address] [place] [address]. All rights reserved. This information is not intended as a substitute for professional medical care. Always follow your healthcare professional's instructions.         Nsaids Pregnancy And Lactation Text: These medications are considered safe up to 30 weeks gestation. It is excreted in breast milk.

## 2022-02-17 ENCOUNTER — PATIENT OUTREACH (OUTPATIENT)
Dept: ADMINISTRATIVE | Facility: HOSPITAL | Age: 47
End: 2022-02-17
Payer: COMMERCIAL

## 2022-02-17 NOTE — LETTER
February 17, 2022    Tania Smith  [address] [place] [address]             [place] [id number]  [place] [facility] [address]  Phone: [phone] Tania Smith,                                                          2nd letter      [facility] provider, Nica Ortega MD, will no longer be seeing patients in the Family Medicine Clinic as of Wednesday, August 1, 2020. She will be providing care as a Hospitalist at [facility]. We are thankful for the excellent care she has provided to her patients and our community.    To make the transition to a new provider as seamless as possible, we encourage you to continue your care with [facility]. To ensure the continuity of your care, Dr Ortega patients will be seamlessly transitioned to one of several Family Medicine providers within our group practice.    Listed below are providers we would like you to consider as you resume your care with [facility]. Information about the unique qualifications and special areas of interest held by our physicians can be found at [email or web address].     Providers:         Location:        Diya Head MD        [facility] 2nd Floor Family Medicine     MD Elaine Winn NP             We are confident that you will continue to receive the excellent treatment and service to which you are accustomed. As always, your medical records are available electronically to any of our providers, ensuring no disruption in your care.  If you need an appointment, please call [phone] to schedule with the team. You can also schedule appointments online at [email or web address].    We value the trust you have placed in [facility] in allowing us the privilege of caring for you and your familys medical needs.      Sincerely,    [facility]

## 2022-02-17 NOTE — PROGRESS NOTES
Population Health Outreach.  Call pt to schedule annual visit appt. with PCP, she did not want to talk, said she was in her office, sending letter via mail, updated care team.

## 2022-02-22 ENCOUNTER — APPOINTMENT (RX ONLY)
Dept: URBAN - METROPOLITAN AREA CLINIC 124 | Facility: CLINIC | Age: 47
Setting detail: DERMATOLOGY
End: 2022-02-22

## 2022-02-22 DIAGNOSIS — L40.0 PSORIASIS VULGARIS: ICD-10-CM

## 2022-02-22 PROCEDURE — 99214 OFFICE O/P EST MOD 30 MIN: CPT

## 2022-02-22 PROCEDURE — ? MEDICATION COUNSELING

## 2022-02-22 PROCEDURE — ? ORDER TESTS

## 2022-02-22 PROCEDURE — ? PRESCRIPTION SAMPLES PROVIDED

## 2022-02-22 PROCEDURE — ? ADDITIONAL NOTES

## 2022-02-22 PROCEDURE — ? COUNSELING

## 2022-02-22 ASSESSMENT — LOCATION SIMPLE DESCRIPTION DERM
LOCATION SIMPLE: LEFT PRETIBIAL REGION
LOCATION SIMPLE: SCALP
LOCATION SIMPLE: LEFT ELBOW
LOCATION SIMPLE: LEFT THIGH
LOCATION SIMPLE: RIGHT PRETIBIAL REGION
LOCATION SIMPLE: RIGHT CHEEK
LOCATION SIMPLE: RIGHT ELBOW
LOCATION SIMPLE: CHIN
LOCATION SIMPLE: LEFT EAR
LOCATION SIMPLE: LEFT CHEEK
LOCATION SIMPLE: RIGHT EAR
LOCATION SIMPLE: LEFT FOREHEAD

## 2022-02-22 ASSESSMENT — LOCATION DETAILED DESCRIPTION DERM
LOCATION DETAILED: LEFT INFERIOR CENTRAL MALAR CHEEK
LOCATION DETAILED: RIGHT CAVUM CONCHA
LOCATION DETAILED: LEFT CHIN
LOCATION DETAILED: LEFT CAVUM CONCHA
LOCATION DETAILED: RIGHT ELBOW
LOCATION DETAILED: LEFT ELBOW
LOCATION DETAILED: LEFT INFERIOR FOREHEAD
LOCATION DETAILED: LEFT PROXIMAL PRETIBIAL REGION
LOCATION DETAILED: LEFT ANTERIOR DISTAL THIGH
LOCATION DETAILED: RIGHT INFERIOR CENTRAL MALAR CHEEK
LOCATION DETAILED: LEFT SUPERIOR PARIETAL SCALP
LOCATION DETAILED: RIGHT PROXIMAL PRETIBIAL REGION
LOCATION DETAILED: RIGHT CENTRAL PARIETAL SCALP

## 2022-02-22 ASSESSMENT — LOCATION ZONE DERM
LOCATION ZONE: LEG
LOCATION ZONE: SCALP
LOCATION ZONE: EAR
LOCATION ZONE: ARM
LOCATION ZONE: FACE

## 2022-02-22 NOTE — PROCEDURE: ADDITIONAL NOTES
Additional Notes: Patient states she has been unsuccessful in contacting "[employer]. She states she has called the number provided multiple times with no answer. I spoke with Bridgette from "[employer]" who stated we should have pt Re-enroll in PAP and send Rx. I will f/u with patient and reach out to Bridgette with any issues regarding her getting medication going forward. Additional Notes: Patient states she has been unsuccessful in contacting [employer] [facility]. She states she has called the number provided multiple times with no answer. I spoke with Bridgette from [employer] who stated we should have pt Re-enroll in PAP and send Rx. I will f/u with patient and reach out to Bridgette with any issues regarding her getting medication going forward.

## 2022-02-22 NOTE — PROCEDURE: MEDICATION COUNSELING
room air Doxycycline Pregnancy And Lactation Text: This medication is Pregnancy Category D and not consider safe during pregnancy. It is also excreted in breast milk but is considered safe for shorter treatment courses.

## 2022-02-22 NOTE — PROCEDURE: ORDER TESTS
Billing Type: Third-Party Bill
Bill For Surgical Tray: no
Performing Laboratory: 0
Expected Date Of Service: 02/22/2022

## 2022-02-22 NOTE — PROCEDURE: ADDITIONAL NOTES
Excision Depth: adipose tissue Additional Notes: Patient consent was obtained to proceed with the visit and recommended plan of care after discussion of all risks and benefits, including the risks of COVID-19 exposure.

## 2022-02-23 ENCOUNTER — RX ONLY (OUTPATIENT)
Age: 47
Setting detail: RX ONLY
End: 2022-02-23

## 2022-02-23 RX ORDER — SECUKINUMAB 150 MG/ML
INJECTION SUBCUTANEOUS
Qty: 1 | Refills: 2 | Status: ACTIVE

## 2022-08-08 NOTE — PROCEDURE: MEDICATION COUNSELING
yes Doxycycline Pregnancy And Lactation Text: This medication is Pregnancy Category D and not consider safe during pregnancy. It is also excreted in breast milk but is considered safe for shorter treatment courses.

## 2023-03-26 NOTE — HPI: RASH (PSORIASIS)
How Severe Is Your Psoriasis?: moderate
Do You Have A Family History Of Psoriasis?: yes
Is This A New Presentation, Or A Follow-Up?: Rash
Unknown

## 2023-05-10 NOTE — PROCEDURE: MEDICATION COUNSELING
[facility] Albendazole Counseling:  I discussed with the patient the risks of albendazole including but not limited to cytopenia, kidney damage, nausea/vomiting and severe allergy.  The patient understands that this medication is being used in an off-label manner.

## 2023-11-02 NOTE — PROCEDURE: MEDICATION COUNSELING
Noted    Encounter Closed     Rifampin Pregnancy And Lactation Text: This medication is Pregnancy Category C and it isn't know if it is safe during pregnancy. It is also excreted in breast milk and should not be used if you are breast feeding.

## 2024-03-06 NOTE — PROCEDURE: MEDICATION COUNSELING
Lab results available in her chart. TSH abnormal, 10.78, T3 and T4 normal. Left vm for patient to call back to verify that she is currently on Synthroid 88mcg daily. Await call back from patient to verify. -MP   Ketoconazole Pregnancy And Lactation Text: This medication is Pregnancy Category C and it isn't know if it is safe during pregnancy. It is also excreted in breast milk and breast feeding isn't recommended.

## (undated) DEVICE — SEE MEDLINE ITEM 157185

## (undated) DEVICE — SYR 30CC LUER LOCK

## (undated) DEVICE — GOWN B1 X-LG X-LONG

## (undated) DEVICE — SYR 10CC LUER LOCK

## (undated) DEVICE — SET DILATOR RENAL

## (undated) DEVICE — GLOVE PI ULTRA TOUCH G SURGEON

## (undated) DEVICE — Device

## (undated) DEVICE — SCRUB HIBICLENS 4% CHG 4OZ

## (undated) DEVICE — SET CYSTO IRRIGATION UNIV SPIK

## (undated) DEVICE — GLOVE SURG ULTRA TOUCH 7

## (undated) DEVICE — GUIDE WIRE MOTION .035 X 150CM

## (undated) DEVICE — SOL IRR WATER STRL 3000 ML

## (undated) DEVICE — CONTAINER SPECIMEN STRL 4OZ

## (undated) DEVICE — BAG LINGEMAN DRAIN UROLOGY

## (undated) DEVICE — GAUZE SPONGE BULKEE 6X6.75IN